# Patient Record
Sex: FEMALE | Race: WHITE | NOT HISPANIC OR LATINO | URBAN - METROPOLITAN AREA
[De-identification: names, ages, dates, MRNs, and addresses within clinical notes are randomized per-mention and may not be internally consistent; named-entity substitution may affect disease eponyms.]

---

## 2023-05-18 ENCOUNTER — TELEPHONE (OUTPATIENT)
Dept: PRIMARY CARE | Facility: CLINIC | Age: 78
End: 2023-05-18
Payer: MEDICARE

## 2023-05-18 NOTE — TELEPHONE ENCOUNTER
Patient called and left  that she needed to reschedule her appointment for 7/27/2023.  Returned call, rescheduled for 8/1/2023.      Patient is requesting for lab orders to be placed for her upcoming appointment.  Call patient when orders placed if needed.  Last labs completed 1/13/2023.

## 2023-07-18 PROBLEM — R09.89 PROLONGED CAPILLARY REFILL TIME: Status: ACTIVE | Noted: 2023-07-18

## 2023-07-18 PROBLEM — J06.9 UPPER RESPIRATORY INFECTION: Status: ACTIVE | Noted: 2023-07-18

## 2023-07-18 PROBLEM — M25.511 RIGHT SHOULDER PAIN: Status: ACTIVE | Noted: 2023-07-18

## 2023-07-18 PROBLEM — R79.89 LOW VITAMIN D LEVEL: Status: ACTIVE | Noted: 2023-07-18

## 2023-07-18 PROBLEM — H69.93 DYSFUNCTION OF BOTH EUSTACHIAN TUBES: Status: ACTIVE | Noted: 2023-07-18

## 2023-07-18 PROBLEM — R07.81 RIB PAIN ON LEFT SIDE: Status: ACTIVE | Noted: 2023-07-18

## 2023-07-18 PROBLEM — Z86.010 HISTORY OF COLON POLYPS: Status: ACTIVE | Noted: 2023-07-18

## 2023-07-18 PROBLEM — S52.614A CLOSED NONDISPLACED FRACTURE OF STYLOID PROCESS OF RIGHT ULNA: Status: ACTIVE | Noted: 2023-07-18

## 2023-07-18 PROBLEM — E04.1 THYROID NODULE: Status: ACTIVE | Noted: 2023-07-18

## 2023-07-18 PROBLEM — M25.631 STIFFNESS OF RIGHT WRIST, NOT ELSEWHERE CLASSIFIED: Status: ACTIVE | Noted: 2023-07-18

## 2023-07-18 PROBLEM — M25.552 HIP PAIN, LEFT: Status: ACTIVE | Noted: 2023-07-18

## 2023-07-18 PROBLEM — S66.912A STRAIN OF LEFT WRIST: Status: ACTIVE | Noted: 2023-07-18

## 2023-07-18 PROBLEM — J04.0 ACUTE LARYNGITIS: Status: ACTIVE | Noted: 2023-07-18

## 2023-07-18 PROBLEM — J34.89 NASAL DISCHARGE WITH WATERY EYES: Status: ACTIVE | Noted: 2023-07-18

## 2023-07-18 PROBLEM — M25.531 RIGHT WRIST PAIN: Status: ACTIVE | Noted: 2023-07-18

## 2023-07-18 PROBLEM — L84 PRE-ULCERATIVE CALLUSES: Status: ACTIVE | Noted: 2023-07-18

## 2023-07-18 PROBLEM — I73.9 PAD (PERIPHERAL ARTERY DISEASE) (CMS-HCC): Status: ACTIVE | Noted: 2023-07-18

## 2023-07-18 PROBLEM — K63.5 COLON POLYPS: Status: ACTIVE | Noted: 2023-07-18

## 2023-07-18 PROBLEM — J06.9 VIRAL URI WITH COUGH: Status: ACTIVE | Noted: 2023-07-18

## 2023-07-18 PROBLEM — J18.9 COMMUNITY ACQUIRED PNEUMONIA: Status: ACTIVE | Noted: 2023-07-18

## 2023-07-18 PROBLEM — H04.209 NASAL DISCHARGE WITH WATERY EYES: Status: ACTIVE | Noted: 2023-07-18

## 2023-07-18 PROBLEM — M25.641 STIFFNESS OF FINGER JOINT OF RIGHT HAND: Status: ACTIVE | Noted: 2023-07-18

## 2023-07-18 PROBLEM — M85.80 OSTEOPENIA: Status: ACTIVE | Noted: 2023-07-18

## 2023-07-18 PROBLEM — R73.03 PREDIABETES: Status: ACTIVE | Noted: 2023-07-18

## 2023-07-18 PROBLEM — B35.3 TINEA PEDIS OF BOTH FEET: Status: ACTIVE | Noted: 2023-07-18

## 2023-07-18 PROBLEM — M81.6 LOCALIZED OSTEOPOROSIS WITHOUT CURRENT PATHOLOGICAL FRACTURE: Status: ACTIVE | Noted: 2023-07-18

## 2023-07-18 PROBLEM — M21.619 BUNION OF GREAT TOE: Status: ACTIVE | Noted: 2023-07-18

## 2023-07-18 PROBLEM — R20.2 PARESTHESIA OF BILATERAL LEGS: Status: ACTIVE | Noted: 2023-07-18

## 2023-07-18 PROBLEM — R10.9 ABDOMINAL CRAMPING: Status: ACTIVE | Noted: 2023-07-18

## 2023-07-18 PROBLEM — J34.89 SINUS PRESSURE: Status: ACTIVE | Noted: 2023-07-18

## 2023-07-18 PROBLEM — L30.9 ECZEMA: Status: ACTIVE | Noted: 2023-07-18

## 2023-07-18 PROBLEM — S52.001A: Status: ACTIVE | Noted: 2023-07-18

## 2023-07-18 PROBLEM — E78.5 DYSLIPIDEMIA: Status: ACTIVE | Noted: 2023-07-18

## 2023-07-18 PROBLEM — S52.101A: Status: ACTIVE | Noted: 2023-07-18

## 2023-07-18 PROBLEM — H66.91 ACUTE OTITIS MEDIA, RIGHT: Status: ACTIVE | Noted: 2023-07-18

## 2023-07-18 PROBLEM — R50.9 FEVER: Status: ACTIVE | Noted: 2023-07-18

## 2023-07-18 PROBLEM — M79.10 MUSCLE ACHE: Status: ACTIVE | Noted: 2023-07-18

## 2023-07-18 PROBLEM — E78.00 ELEVATED LDL CHOLESTEROL LEVEL: Status: ACTIVE | Noted: 2023-07-18

## 2023-07-18 PROBLEM — J01.90 ACUTE SINUS INFECTION: Status: ACTIVE | Noted: 2023-07-18

## 2023-07-18 PROBLEM — M79.606 LOWER EXTREMITY PAIN, DIFFUSE: Status: ACTIVE | Noted: 2023-07-18

## 2023-07-18 PROBLEM — R07.89 INTERMITTENT LEFT-SIDED CHEST PAIN: Status: ACTIVE | Noted: 2023-07-18

## 2023-07-18 PROBLEM — B35.1 ONYCHOMYCOSIS OF TOENAIL: Status: ACTIVE | Noted: 2023-07-18

## 2023-07-18 PROBLEM — E03.9 ACQUIRED HYPOTHYROIDISM: Status: ACTIVE | Noted: 2023-07-18

## 2023-07-18 PROBLEM — M81.0 OSTEOPOROSIS: Status: ACTIVE | Noted: 2023-07-18

## 2023-07-18 PROBLEM — Z86.0100 HISTORY OF COLON POLYPS: Status: ACTIVE | Noted: 2023-07-18

## 2023-07-18 PROBLEM — R26.89 IMBALANCE: Status: ACTIVE | Noted: 2023-07-18

## 2023-07-18 PROBLEM — M47.27 LUMBOSACRAL SPONDYLOSIS WITH RADICULOPATHY: Status: ACTIVE | Noted: 2023-07-18

## 2023-07-18 PROBLEM — S69.91XA INJURY OF RIGHT WRIST: Status: ACTIVE | Noted: 2023-07-18

## 2023-07-18 PROBLEM — R35.0 URINE FREQUENCY: Status: ACTIVE | Noted: 2023-07-18

## 2023-07-18 PROBLEM — M79.621 PAIN OF RIGHT UPPER ARM: Status: ACTIVE | Noted: 2023-07-18

## 2023-07-18 PROBLEM — M20.5X2 HALLUX LIMITUS OF LEFT FOOT: Status: ACTIVE | Noted: 2023-07-18

## 2023-07-18 PROBLEM — J02.9 SORE THROAT: Status: ACTIVE | Noted: 2023-07-18

## 2023-07-18 PROBLEM — M62.838 MUSCLE SPASM: Status: ACTIVE | Noted: 2023-07-18

## 2023-07-18 PROBLEM — I10 BENIGN ESSENTIAL HTN: Status: ACTIVE | Noted: 2023-07-18

## 2023-07-18 PROBLEM — K52.9 CHRONIC COLITIS: Status: ACTIVE | Noted: 2023-07-18

## 2023-07-18 PROBLEM — R16.1 SPLENIC MASS: Status: ACTIVE | Noted: 2023-07-18

## 2023-07-18 PROBLEM — J20.9 ACUTE BRONCHITIS: Status: ACTIVE | Noted: 2023-07-18

## 2023-07-18 PROBLEM — E04.9 THYROID ENLARGED: Status: ACTIVE | Noted: 2023-07-18

## 2023-07-18 PROBLEM — S76.219A GROIN STRAIN: Status: ACTIVE | Noted: 2023-07-18

## 2023-07-18 PROBLEM — R19.7 DIARRHEA: Status: ACTIVE | Noted: 2023-07-18

## 2023-07-18 PROBLEM — R05.9 COUGH: Status: ACTIVE | Noted: 2023-07-18

## 2023-07-18 PROBLEM — S52.551A: Status: ACTIVE | Noted: 2023-07-18

## 2023-07-18 RX ORDER — CALCIUM CARBONATE 600 MG
1 TABLET ORAL DAILY
COMMUNITY
Start: 2019-01-30

## 2023-07-18 RX ORDER — ASCORBIC ACID 125 MG
1 TABLET,CHEWABLE ORAL DAILY
COMMUNITY

## 2023-07-18 RX ORDER — IBUPROFEN 100 MG/5ML
1 SUSPENSION, ORAL (FINAL DOSE FORM) ORAL DAILY
COMMUNITY
Start: 2019-01-30

## 2023-07-18 RX ORDER — CHOLESTYRAMINE 4 G/9G
POWDER, FOR SUSPENSION ORAL EVERY OTHER DAY
COMMUNITY
Start: 2022-07-29 | End: 2024-03-06 | Stop reason: SDUPTHER

## 2023-07-18 RX ORDER — TIZANIDINE 4 MG/1
TABLET ORAL
COMMUNITY
Start: 2021-12-10 | End: 2023-08-01 | Stop reason: SDUPTHER

## 2023-07-18 RX ORDER — PRAVASTATIN SODIUM 20 MG/1
1 TABLET ORAL DAILY
COMMUNITY
Start: 2021-05-27 | End: 2023-08-01 | Stop reason: ALTCHOICE

## 2023-07-18 RX ORDER — LEVOTHYROXINE SODIUM 75 UG/1
1 TABLET ORAL DAILY
COMMUNITY
Start: 2017-03-06 | End: 2023-08-01 | Stop reason: SDUPTHER

## 2023-07-18 RX ORDER — HYDROCHLOROTHIAZIDE 12.5 MG/1
1 TABLET ORAL
COMMUNITY
Start: 2018-05-21 | End: 2023-08-01 | Stop reason: SDUPTHER

## 2023-07-18 RX ORDER — ACETAMINOPHEN 500 MG
TABLET ORAL
COMMUNITY
Start: 2018-01-18

## 2023-07-18 RX ORDER — CALCIUM CARBONATE/VITAMIN D3 500-10/5ML
1 LIQUID (ML) ORAL DAILY
COMMUNITY
Start: 2019-01-30

## 2023-07-18 RX ORDER — EPINEPHRINE 0.22MG
1 AEROSOL WITH ADAPTER (ML) INHALATION DAILY
COMMUNITY
Start: 2016-07-29

## 2023-07-28 LAB
ALANINE AMINOTRANSFERASE (SGPT) (U/L) IN SER/PLAS: 16 U/L (ref 7–45)
ALBUMIN (G/DL) IN SER/PLAS: 4.3 G/DL (ref 3.4–5)
ALKALINE PHOSPHATASE (U/L) IN SER/PLAS: 82 U/L (ref 33–136)
ANION GAP IN SER/PLAS: 14 MMOL/L (ref 10–20)
APPEARANCE, URINE: CLEAR
ASPARTATE AMINOTRANSFERASE (SGOT) (U/L) IN SER/PLAS: 19 U/L (ref 9–39)
BILIRUBIN TOTAL (MG/DL) IN SER/PLAS: 0.7 MG/DL (ref 0–1.2)
BILIRUBIN, URINE: NEGATIVE
BLOOD, URINE: NEGATIVE
CALCIUM (MG/DL) IN SER/PLAS: 9.7 MG/DL (ref 8.6–10.6)
CARBON DIOXIDE, TOTAL (MMOL/L) IN SER/PLAS: 31 MMOL/L (ref 21–32)
CHLORIDE (MMOL/L) IN SER/PLAS: 101 MMOL/L (ref 98–107)
CHOLESTEROL (MG/DL) IN SER/PLAS: 243 MG/DL (ref 0–199)
CHOLESTEROL IN HDL (MG/DL) IN SER/PLAS: 66.3 MG/DL
CHOLESTEROL/HDL RATIO: 3.7
COLOR, URINE: YELLOW
CREATININE (MG/DL) IN SER/PLAS: 0.79 MG/DL (ref 0.5–1.05)
ERYTHROCYTE DISTRIBUTION WIDTH (RATIO) BY AUTOMATED COUNT: 14 % (ref 11.5–14.5)
ERYTHROCYTE MEAN CORPUSCULAR HEMOGLOBIN CONCENTRATION (G/DL) BY AUTOMATED: 31.1 G/DL (ref 32–36)
ERYTHROCYTE MEAN CORPUSCULAR VOLUME (FL) BY AUTOMATED COUNT: 95 FL (ref 80–100)
ERYTHROCYTES (10*6/UL) IN BLOOD BY AUTOMATED COUNT: 4.64 X10E12/L (ref 4–5.2)
GFR FEMALE: 76 ML/MIN/1.73M2
GLUCOSE (MG/DL) IN SER/PLAS: 89 MG/DL (ref 74–99)
GLUCOSE, URINE: NEGATIVE MG/DL
HEMATOCRIT (%) IN BLOOD BY AUTOMATED COUNT: 44.1 % (ref 36–46)
HEMOGLOBIN (G/DL) IN BLOOD: 13.7 G/DL (ref 12–16)
KETONES, URINE: NEGATIVE MG/DL
LDL: 163 MG/DL (ref 0–99)
LEUKOCYTE ESTERASE, URINE: NEGATIVE
LEUKOCYTES (10*3/UL) IN BLOOD BY AUTOMATED COUNT: 4.9 X10E9/L (ref 4.4–11.3)
NITRITE, URINE: NEGATIVE
NRBC (PER 100 WBCS) BY AUTOMATED COUNT: 0 /100 WBC (ref 0–0)
PH, URINE: 7 (ref 5–8)
PLATELETS (10*3/UL) IN BLOOD AUTOMATED COUNT: 207 X10E9/L (ref 150–450)
POTASSIUM (MMOL/L) IN SER/PLAS: 4.3 MMOL/L (ref 3.5–5.3)
PROTEIN TOTAL: 6.9 G/DL (ref 6.4–8.2)
PROTEIN, URINE: NEGATIVE MG/DL
SODIUM (MMOL/L) IN SER/PLAS: 142 MMOL/L (ref 136–145)
SPECIFIC GRAVITY, URINE: 1.01 (ref 1–1.03)
THYROTROPIN (MIU/L) IN SER/PLAS BY DETECTION LIMIT <= 0.05 MIU/L: 0.49 MIU/L (ref 0.44–3.98)
TRIGLYCERIDE (MG/DL) IN SER/PLAS: 71 MG/DL (ref 0–149)
UREA NITROGEN (MG/DL) IN SER/PLAS: 20 MG/DL (ref 6–23)
UROBILINOGEN, URINE: <2 MG/DL (ref 0–1.9)
VLDL: 14 MG/DL (ref 0–40)

## 2023-07-31 ENCOUNTER — TELEPHONE (OUTPATIENT)
Dept: PRIMARY CARE | Facility: CLINIC | Age: 78
End: 2023-07-31
Payer: MEDICARE

## 2023-07-31 NOTE — TELEPHONE ENCOUNTER
----- Message from Wang Ramirez MD sent at 7/29/2023  8:24 AM EDT -----  LDL is further elevated, other labs look good.  ----- Message -----  From: [facility] - Lab Results In  Sent: 7/28/2023   9:42 PM EDT  To: Wang Ramirez MD

## 2023-08-01 ENCOUNTER — OFFICE VISIT (OUTPATIENT)
Dept: PRIMARY CARE | Facility: CLINIC | Age: 78
End: 2023-08-01
Payer: MEDICARE

## 2023-08-01 VITALS
HEART RATE: 76 BPM | OXYGEN SATURATION: 99 % | DIASTOLIC BLOOD PRESSURE: 72 MMHG | SYSTOLIC BLOOD PRESSURE: 128 MMHG | RESPIRATION RATE: 12 BRPM | BODY MASS INDEX: 23.74 KG/M2 | WEIGHT: 134 LBS | HEIGHT: 63 IN

## 2023-08-01 DIAGNOSIS — I73.9 PAD (PERIPHERAL ARTERY DISEASE) (CMS-HCC): ICD-10-CM

## 2023-08-01 DIAGNOSIS — E03.9 ACQUIRED HYPOTHYROIDISM: ICD-10-CM

## 2023-08-01 DIAGNOSIS — M81.0 AGE RELATED OSTEOPOROSIS, UNSPECIFIED PATHOLOGICAL FRACTURE PRESENCE: ICD-10-CM

## 2023-08-01 DIAGNOSIS — I10 BENIGN ESSENTIAL HTN: Primary | ICD-10-CM

## 2023-08-01 DIAGNOSIS — Z00.00 HEALTHCARE MAINTENANCE: ICD-10-CM

## 2023-08-01 DIAGNOSIS — E78.00 ELEVATED LDL CHOLESTEROL LEVEL: ICD-10-CM

## 2023-08-01 DIAGNOSIS — M62.838 MUSCLE SPASM: ICD-10-CM

## 2023-08-01 PROBLEM — M25.511 RIGHT SHOULDER PAIN: Status: RESOLVED | Noted: 2023-07-18 | Resolved: 2023-08-01

## 2023-08-01 PROBLEM — R05.9 COUGH: Status: RESOLVED | Noted: 2023-07-18 | Resolved: 2023-08-01

## 2023-08-01 PROBLEM — S69.91XA INJURY OF RIGHT WRIST: Status: RESOLVED | Noted: 2023-07-18 | Resolved: 2023-08-01

## 2023-08-01 PROBLEM — R50.9 FEVER: Status: RESOLVED | Noted: 2023-07-18 | Resolved: 2023-08-01

## 2023-08-01 PROBLEM — R35.0 URINE FREQUENCY: Status: RESOLVED | Noted: 2023-07-18 | Resolved: 2023-08-01

## 2023-08-01 PROBLEM — R79.89 LOW VITAMIN D LEVEL: Status: RESOLVED | Noted: 2023-07-18 | Resolved: 2023-08-01

## 2023-08-01 PROBLEM — E78.5 DYSLIPIDEMIA: Status: RESOLVED | Noted: 2023-07-18 | Resolved: 2023-08-01

## 2023-08-01 PROBLEM — S52.551A: Status: RESOLVED | Noted: 2023-07-18 | Resolved: 2023-08-01

## 2023-08-01 PROBLEM — J20.9 ACUTE BRONCHITIS: Status: RESOLVED | Noted: 2023-07-18 | Resolved: 2023-08-01

## 2023-08-01 PROBLEM — M79.10 MUSCLE ACHE: Status: RESOLVED | Noted: 2023-07-18 | Resolved: 2023-08-01

## 2023-08-01 PROBLEM — J01.90 ACUTE SINUS INFECTION: Status: RESOLVED | Noted: 2023-07-18 | Resolved: 2023-08-01

## 2023-08-01 PROBLEM — J34.89 SINUS PRESSURE: Status: RESOLVED | Noted: 2023-07-18 | Resolved: 2023-08-01

## 2023-08-01 PROBLEM — S52.101A: Status: RESOLVED | Noted: 2023-07-18 | Resolved: 2023-08-01

## 2023-08-01 PROBLEM — R26.89 IMBALANCE: Status: RESOLVED | Noted: 2023-07-18 | Resolved: 2023-08-01

## 2023-08-01 PROBLEM — R09.89 PROLONGED CAPILLARY REFILL TIME: Status: RESOLVED | Noted: 2023-07-18 | Resolved: 2023-08-01

## 2023-08-01 PROBLEM — M85.80 OSTEOPENIA: Status: RESOLVED | Noted: 2023-07-18 | Resolved: 2023-08-01

## 2023-08-01 PROBLEM — M25.531 RIGHT WRIST PAIN: Status: RESOLVED | Noted: 2023-07-18 | Resolved: 2023-08-01

## 2023-08-01 PROBLEM — M79.621 PAIN OF RIGHT UPPER ARM: Status: RESOLVED | Noted: 2023-07-18 | Resolved: 2023-08-01

## 2023-08-01 PROBLEM — S76.219A GROIN STRAIN: Status: RESOLVED | Noted: 2023-07-18 | Resolved: 2023-08-01

## 2023-08-01 PROBLEM — J04.0 ACUTE LARYNGITIS: Status: RESOLVED | Noted: 2023-07-18 | Resolved: 2023-08-01

## 2023-08-01 PROBLEM — J02.9 SORE THROAT: Status: RESOLVED | Noted: 2023-07-18 | Resolved: 2023-08-01

## 2023-08-01 PROBLEM — E04.1 THYROID NODULE: Status: RESOLVED | Noted: 2023-07-18 | Resolved: 2023-08-01

## 2023-08-01 PROBLEM — B35.3 TINEA PEDIS OF BOTH FEET: Status: RESOLVED | Noted: 2023-07-18 | Resolved: 2023-08-01

## 2023-08-01 PROBLEM — H66.91 ACUTE OTITIS MEDIA, RIGHT: Status: RESOLVED | Noted: 2023-07-18 | Resolved: 2023-08-01

## 2023-08-01 PROBLEM — J06.9 UPPER RESPIRATORY INFECTION: Status: RESOLVED | Noted: 2023-07-18 | Resolved: 2023-08-01

## 2023-08-01 PROBLEM — S52.614A CLOSED NONDISPLACED FRACTURE OF STYLOID PROCESS OF RIGHT ULNA: Status: RESOLVED | Noted: 2023-07-18 | Resolved: 2023-08-01

## 2023-08-01 PROBLEM — S66.912A STRAIN OF LEFT WRIST: Status: RESOLVED | Noted: 2023-07-18 | Resolved: 2023-08-01

## 2023-08-01 PROBLEM — M25.631 STIFFNESS OF RIGHT WRIST, NOT ELSEWHERE CLASSIFIED: Status: RESOLVED | Noted: 2023-07-18 | Resolved: 2023-08-01

## 2023-08-01 PROBLEM — J18.9 COMMUNITY ACQUIRED PNEUMONIA: Status: RESOLVED | Noted: 2023-07-18 | Resolved: 2023-08-01

## 2023-08-01 PROBLEM — S52.001A: Status: RESOLVED | Noted: 2023-07-18 | Resolved: 2023-08-01

## 2023-08-01 PROBLEM — R73.03 PREDIABETES: Status: RESOLVED | Noted: 2023-07-18 | Resolved: 2023-08-01

## 2023-08-01 PROBLEM — J06.9 VIRAL URI WITH COUGH: Status: RESOLVED | Noted: 2023-07-18 | Resolved: 2023-08-01

## 2023-08-01 PROBLEM — L84 PRE-ULCERATIVE CALLUSES: Status: RESOLVED | Noted: 2023-07-18 | Resolved: 2023-08-01

## 2023-08-01 PROBLEM — R07.81 RIB PAIN ON LEFT SIDE: Status: RESOLVED | Noted: 2023-07-18 | Resolved: 2023-08-01

## 2023-08-01 PROBLEM — M25.552 HIP PAIN, LEFT: Status: RESOLVED | Noted: 2023-07-18 | Resolved: 2023-08-01

## 2023-08-01 PROBLEM — E04.9 THYROID ENLARGED: Status: RESOLVED | Noted: 2023-07-18 | Resolved: 2023-08-01

## 2023-08-01 PROCEDURE — 99214 OFFICE O/P EST MOD 30 MIN: CPT | Performed by: FAMILY MEDICINE

## 2023-08-01 PROCEDURE — 1036F TOBACCO NON-USER: CPT | Performed by: FAMILY MEDICINE

## 2023-08-01 PROCEDURE — 1159F MED LIST DOCD IN RCRD: CPT | Performed by: FAMILY MEDICINE

## 2023-08-01 PROCEDURE — 3078F DIAST BP <80 MM HG: CPT | Performed by: FAMILY MEDICINE

## 2023-08-01 PROCEDURE — 3074F SYST BP LT 130 MM HG: CPT | Performed by: FAMILY MEDICINE

## 2023-08-01 PROCEDURE — 1125F AMNT PAIN NOTED PAIN PRSNT: CPT | Performed by: FAMILY MEDICINE

## 2023-08-01 RX ORDER — LEVOTHYROXINE SODIUM 75 UG/1
75 TABLET ORAL DAILY
Qty: 90 TABLET | Refills: 1 | Status: SHIPPED | OUTPATIENT
Start: 2023-08-01 | End: 2023-08-24

## 2023-08-01 RX ORDER — HYDROCHLOROTHIAZIDE 12.5 MG/1
12.5 TABLET ORAL
Qty: 90 TABLET | Refills: 1 | Status: SHIPPED | OUTPATIENT
Start: 2023-08-01 | End: 2023-08-24

## 2023-08-01 RX ORDER — TIZANIDINE 4 MG/1
4 TABLET ORAL NIGHTLY PRN
Qty: 90 TABLET | Refills: 1 | Status: SHIPPED | OUTPATIENT
Start: 2023-08-01 | End: 2024-01-02

## 2023-08-01 ASSESSMENT — ENCOUNTER SYMPTOMS
HEADACHES: 0
CHEST TIGHTNESS: 0
NERVOUS/ANXIOUS: 0
ABDOMINAL PAIN: 0
DYSPHORIC MOOD: 0
LIGHT-HEADEDNESS: 0
WHEEZING: 0
ABDOMINAL DISTENTION: 0
FATIGUE: 0
NAUSEA: 0
CHILLS: 0
DIFFICULTY URINATING: 0
MYALGIAS: 0
DEPRESSION: 0
SLEEP DISTURBANCE: 0
SINUS PRESSURE: 0
ARTHRALGIAS: 0
ADENOPATHY: 0
DIARRHEA: 0
LOSS OF SENSATION IN FEET: 0
SHORTNESS OF BREATH: 0
DYSURIA: 0
OCCASIONAL FEELINGS OF UNSTEADINESS: 0
BRUISES/BLEEDS EASILY: 0
APPETITE CHANGE: 0
FEVER: 0

## 2023-08-01 NOTE — PROGRESS NOTES
"Subjective   Patient ID: Jacqueline Min is a 77 y.o. female who presents for Follow-up.  Pt has chronic stable HTN, PVD.  Pt is taking HCTZ. Tolerating well.  Exercising 6-7 days per week   Low sodium diet is usually being followed.   Is monitoring home blood pressures. Readings are in good range.  Denies HA, vision changes or CP.     Pt has Dyslipidemia and asymptomatic PAD.  Lipid panel showed .  Currently not taking Pravastatin, stopped in February to see if Cholestyramine from GI would control her LDL.    For chronic back pain and muscle cramping she is doing well on Tizanidine as needed.     Pt has chronic, stable hypothyroidism.   Taking Synthroid  TSH is in good range.  Energy is ok. Pt denies significant weight gain, low mood, constipation, or skin changes.   Medication is tolerated well.     For chronic foot pain she would like referral to new podiatrist.     Pt has chronic OP. Taking calcium and Vit D. Tolerating well. DEXA is due to recheck.           Review of Systems   Constitutional:  Negative for appetite change, chills, fatigue and fever.   HENT:  Negative for congestion, ear pain and sinus pressure.    Eyes:  Negative for visual disturbance.   Respiratory:  Negative for chest tightness, shortness of breath and wheezing.    Cardiovascular:  Negative for chest pain.   Gastrointestinal:  Negative for abdominal distention, abdominal pain, diarrhea and nausea.   Genitourinary:  Negative for difficulty urinating, dysuria and pelvic pain.   Musculoskeletal:  Negative for arthralgias and myalgias.   Skin:  Negative for rash.   Allergic/Immunologic: Negative for immunocompromised state.   Neurological:  Negative for light-headedness and headaches.   Hematological:  Negative for adenopathy. Does not bruise/bleed easily.   Psychiatric/Behavioral:  Negative for dysphoric mood and sleep disturbance. The patient is not nervous/anxious.        Objective   /72   Pulse 76   Resp 12   Ht 1.6 m (5' 3\")  "  Wt 60.8 kg (134 lb)   SpO2 99%   BMI 23.74 kg/m²    Physical Exam  Constitutional:       General: She is not in acute distress.     Appearance: Normal appearance.   Cardiovascular:      Rate and Rhythm: Normal rate and regular rhythm.      Heart sounds: Normal heart sounds. No murmur heard.  Pulmonary:      Effort: Pulmonary effort is normal.      Breath sounds: Normal breath sounds.   Abdominal:      Palpations: Abdomen is soft.      Tenderness: There is no abdominal tenderness.   Neurological:      Mental Status: She is alert.   Psychiatric:         Mood and Affect: Mood normal.         Judgment: Judgment normal.           Assessment/Plan   Diagnoses and all orders for this visit:  Benign essential HTN - doing well, continue current med and monitor  Elevated LDL cholesterol level - LDL higher, continue Cholestyramine for now and will consider adding back statin if needed.  PAD (peripheral artery disease) (CMS/HCC) - asymptomatic, to monitor with health screening yearly  Acquired hypothyroidism - monitor with labs  Age related osteoporosis, unspecified pathological fracture presence - due for repeat DEXA, planned in December  Follow up in 6months, 30mins for physical

## 2023-08-01 NOTE — PROGRESS NOTES
"Subjective   Patient ID: Jacqueline Min is a 77 y.o. female who presents for Follow-up.    HPI     Review of Systems    Objective   /72   Pulse 76   Resp 12   Ht 1.6 m (5' 3\")   Wt 60.8 kg (134 lb)   SpO2 99%   BMI 23.74 kg/m²     Physical Exam    Assessment/Plan          "

## 2023-08-16 DIAGNOSIS — I10 BENIGN ESSENTIAL HTN: ICD-10-CM

## 2023-08-16 DIAGNOSIS — E03.9 ACQUIRED HYPOTHYROIDISM: ICD-10-CM

## 2023-08-24 RX ORDER — LEVOTHYROXINE SODIUM 75 UG/1
75 TABLET ORAL DAILY
Qty: 90 TABLET | Refills: 1 | Status: SHIPPED | OUTPATIENT
Start: 2023-08-24 | End: 2024-02-22 | Stop reason: SDUPTHER

## 2023-08-24 RX ORDER — HYDROCHLOROTHIAZIDE 12.5 MG/1
12.5 TABLET ORAL
Qty: 90 TABLET | Refills: 1 | Status: SHIPPED | OUTPATIENT
Start: 2023-08-24 | End: 2024-02-22 | Stop reason: SDUPTHER

## 2023-10-17 ENCOUNTER — TELEPHONE (OUTPATIENT)
Dept: PRIMARY CARE | Facility: CLINIC | Age: 78
End: 2023-10-17
Payer: MEDICARE

## 2023-10-17 DIAGNOSIS — Z79.2 PROPHYLACTIC ANTIBIOTIC: Primary | ICD-10-CM

## 2023-10-17 RX ORDER — AMOXICILLIN 500 MG/1
2000 CAPSULE ORAL ONCE AS NEEDED
Qty: 4 CAPSULE | Refills: 2 | Status: SHIPPED | OUTPATIENT
Start: 2023-10-17

## 2023-10-17 NOTE — TELEPHONE ENCOUNTER
Pt has dentist appt today and needs amox 500mg (4 caps) prior to appt and forgot to call in prior. Are you able to do this for her?

## 2023-11-27 ENCOUNTER — OFFICE VISIT (OUTPATIENT)
Dept: PRIMARY CARE | Facility: CLINIC | Age: 78
End: 2023-11-27
Payer: MEDICARE

## 2023-11-27 VITALS
HEART RATE: 68 BPM | RESPIRATION RATE: 12 BRPM | BODY MASS INDEX: 24.27 KG/M2 | DIASTOLIC BLOOD PRESSURE: 78 MMHG | SYSTOLIC BLOOD PRESSURE: 124 MMHG | TEMPERATURE: 96.8 F | WEIGHT: 137 LBS

## 2023-11-27 DIAGNOSIS — J20.9 ACUTE BRONCHITIS WITH BRONCHOSPASM: Primary | ICD-10-CM

## 2023-11-27 PROCEDURE — 1036F TOBACCO NON-USER: CPT | Performed by: FAMILY MEDICINE

## 2023-11-27 PROCEDURE — 1125F AMNT PAIN NOTED PAIN PRSNT: CPT | Performed by: FAMILY MEDICINE

## 2023-11-27 PROCEDURE — 3074F SYST BP LT 130 MM HG: CPT | Performed by: FAMILY MEDICINE

## 2023-11-27 PROCEDURE — 99213 OFFICE O/P EST LOW 20 MIN: CPT | Performed by: FAMILY MEDICINE

## 2023-11-27 PROCEDURE — 3078F DIAST BP <80 MM HG: CPT | Performed by: FAMILY MEDICINE

## 2023-11-27 PROCEDURE — 1159F MED LIST DOCD IN RCRD: CPT | Performed by: FAMILY MEDICINE

## 2023-11-27 RX ORDER — AZITHROMYCIN 250 MG/1
TABLET, FILM COATED ORAL
Qty: 6 TABLET | Refills: 0 | Status: SHIPPED | OUTPATIENT
Start: 2023-11-27 | End: 2023-12-02

## 2023-11-27 RX ORDER — ALBUTEROL SULFATE 90 UG/1
1-2 AEROSOL, METERED RESPIRATORY (INHALATION) EVERY 4 HOURS PRN
Qty: 8.5 G | Refills: 0 | Status: SHIPPED | OUTPATIENT
Start: 2023-11-27 | End: 2024-02-22 | Stop reason: WASHOUT

## 2023-11-27 RX ORDER — BENZONATATE 200 MG/1
200 CAPSULE ORAL 3 TIMES DAILY PRN
Qty: 30 CAPSULE | Refills: 0 | Status: SHIPPED | OUTPATIENT
Start: 2023-11-27 | End: 2023-12-27

## 2023-11-27 ASSESSMENT — ENCOUNTER SYMPTOMS
DIFFICULTY URINATING: 0
DYSPHORIC MOOD: 0
ABDOMINAL PAIN: 0
BRUISES/BLEEDS EASILY: 0
NAUSEA: 0
WHEEZING: 0
HEADACHES: 0
COUGH: 1
SHORTNESS OF BREATH: 0
ADENOPATHY: 0
LIGHT-HEADEDNESS: 0
SLEEP DISTURBANCE: 0
FATIGUE: 1
CHILLS: 0
MYALGIAS: 0
SINUS PRESSURE: 0
APPETITE CHANGE: 0
DIARRHEA: 0
ABDOMINAL DISTENTION: 0
ARTHRALGIAS: 0
NERVOUS/ANXIOUS: 0
CHEST TIGHTNESS: 0
DYSURIA: 0
FEVER: 0

## 2023-11-27 NOTE — PROGRESS NOTES
Subjective   Patient ID: Jacqueline Min is a 77 y.o. female who presents for Cough.    HPI     Review of Systems    Objective   /78   Pulse 68   Temp 36 °C (96.8 °F)   Resp 12   Wt 62.1 kg (137 lb)   BMI 24.27 kg/m²     Physical Exam    Assessment/Plan

## 2023-11-27 NOTE — PROGRESS NOTES
Subjective   Patient ID: Jacqueline Min is a 77 y.o. female who presents for Cough.  Pt has about 2 weeks productive cough, hoarseness and sore throat. Denies fever, chills, sinus congestion, PND. COVID test negative 4 days ago .    Pt reports symptoms are worsening.   Pt has tried Mucinex DM for treatment without improvement.     Cough  Pertinent negatives include no chest pain, chills, ear pain, fever, headaches, myalgias, rash, shortness of breath or wheezing.       Review of Systems   Constitutional:  Positive for fatigue. Negative for appetite change, chills and fever.   HENT:  Negative for congestion, ear pain and sinus pressure.    Eyes:  Negative for visual disturbance.   Respiratory:  Positive for cough. Negative for chest tightness, shortness of breath and wheezing.    Cardiovascular:  Negative for chest pain.   Gastrointestinal:  Negative for abdominal distention, abdominal pain, diarrhea and nausea.   Genitourinary:  Negative for difficulty urinating, dysuria and pelvic pain.   Musculoskeletal:  Negative for arthralgias and myalgias.   Skin:  Negative for rash.   Allergic/Immunologic: Negative for immunocompromised state.   Neurological:  Negative for light-headedness and headaches.   Hematological:  Negative for adenopathy. Does not bruise/bleed easily.   Psychiatric/Behavioral:  Negative for dysphoric mood and sleep disturbance. The patient is not nervous/anxious.        Objective   /78   Pulse 68   Temp 36 °C (96.8 °F)   Resp 12   Wt 62.1 kg (137 lb)   BMI 24.27 kg/m²    Physical Exam  Constitutional:       General: She is not in acute distress.     Appearance: Normal appearance. She is not ill-appearing.   HENT:      Head: Normocephalic and atraumatic.      Right Ear: Tympanic membrane, ear canal and external ear normal.      Left Ear: Tympanic membrane, ear canal and external ear normal.      Nose: Congestion present. No rhinorrhea.      Mouth/Throat:      Mouth: Mucous membranes are  moist.      Pharynx: No oropharyngeal exudate or posterior oropharyngeal erythema.   Eyes:      Conjunctiva/sclera: Conjunctivae normal.   Cardiovascular:      Rate and Rhythm: Normal rate and regular rhythm.      Heart sounds: Normal heart sounds. No murmur heard.  Pulmonary:      Breath sounds: Wheezing (L base) present. No rhonchi or rales.   Abdominal:      General: Bowel sounds are normal. There is no distension.      Palpations: Abdomen is soft. There is no mass.      Tenderness: There is no abdominal tenderness.   Musculoskeletal:      Cervical back: Neck supple. No tenderness.   Lymphadenopathy:      Cervical: No cervical adenopathy.   Neurological:      Mental Status: She is alert.   Psychiatric:         Mood and Affect: Mood normal.         Behavior: Behavior normal.         Judgment: Judgment normal.           Assessment/Plan   Diagnoses and all orders for this visit:  Acute bronchitis with bronchospasm- with duration of symptoms will treat with Zpak, Albuterol and Tessalon. Recommend rest, increased fluids, nasal saline at least 3x daily (Ie. Sinus Rinse), and Tylenol as needed.   Follow up as planned

## 2023-12-18 ENCOUNTER — ANCILLARY PROCEDURE (OUTPATIENT)
Dept: RADIOLOGY | Facility: CLINIC | Age: 78
End: 2023-12-18
Payer: MEDICARE

## 2023-12-18 DIAGNOSIS — M81.6 LOCALIZED OSTEOPOROSIS (LEQUESNE): ICD-10-CM

## 2023-12-18 PROCEDURE — 77080 DXA BONE DENSITY AXIAL: CPT | Performed by: RADIOLOGY

## 2023-12-18 PROCEDURE — 77080 DXA BONE DENSITY AXIAL: CPT

## 2024-01-02 DIAGNOSIS — M62.838 MUSCLE SPASM: ICD-10-CM

## 2024-01-02 RX ORDER — TIZANIDINE 4 MG/1
TABLET ORAL
Qty: 90 TABLET | Refills: 0 | Status: SHIPPED | OUTPATIENT
Start: 2024-01-02

## 2024-01-05 ENCOUNTER — TELEPHONE (OUTPATIENT)
Dept: PRIMARY CARE | Facility: CLINIC | Age: 79
End: 2024-01-05
Payer: MEDICARE

## 2024-01-05 NOTE — TELEPHONE ENCOUNTER
Spoke w/pt regarding results and instructions in detail. Pt to speak  with PCP at visit about need for calcium, unsure if she should take that much d/t risks associated with too much calcium.

## 2024-01-05 NOTE — TELEPHONE ENCOUNTER
----- Message from Wang Ramirez MD sent at 1/4/2024  7:49 PM EST -----  DEXA shows osteopenia (mildly low bone density). Recommend regular exercise and calcium rich diet ( ie. milk, yogurt, cheese). Also start Calcium 600mg once daily, and Vit [id number] daily. Recheck DEXA in 2yrs   ----- Message -----  From: [facility], Radiology Results In  Sent: 1/4/2024   4:21 PM EST  To: Wang Ramirez MD

## 2024-01-18 ENCOUNTER — APPOINTMENT (OUTPATIENT)
Dept: PRIMARY CARE | Facility: CLINIC | Age: 79
End: 2024-01-18
Payer: MEDICARE

## 2024-02-19 ENCOUNTER — LAB (OUTPATIENT)
Dept: LAB | Facility: LAB | Age: 79
End: 2024-02-19
Payer: MEDICARE

## 2024-02-19 DIAGNOSIS — Z00.00 HEALTHCARE MAINTENANCE: ICD-10-CM

## 2024-02-19 DIAGNOSIS — E03.9 ACQUIRED HYPOTHYROIDISM: ICD-10-CM

## 2024-02-19 DIAGNOSIS — I10 BENIGN ESSENTIAL HTN: ICD-10-CM

## 2024-02-19 DIAGNOSIS — E78.00 ELEVATED LDL CHOLESTEROL LEVEL: ICD-10-CM

## 2024-02-19 LAB
ALBUMIN SERPL BCP-MCNC: 4.4 G/DL (ref 3.4–5)
ALP SERPL-CCNC: 90 U/L (ref 33–136)
ALT SERPL W P-5'-P-CCNC: 16 U/L (ref 7–45)
ANION GAP SERPL CALC-SCNC: 14 MMOL/L (ref 10–20)
AST SERPL W P-5'-P-CCNC: 16 U/L (ref 9–39)
BILIRUB SERPL-MCNC: 0.6 MG/DL (ref 0–1.2)
BUN SERPL-MCNC: 17 MG/DL (ref 6–23)
CALCIUM SERPL-MCNC: 9.8 MG/DL (ref 8.6–10.6)
CHLORIDE SERPL-SCNC: 100 MMOL/L (ref 98–107)
CHOLEST SERPL-MCNC: 279 MG/DL (ref 0–199)
CHOLESTEROL/HDL RATIO: 4.4
CO2 SERPL-SCNC: 32 MMOL/L (ref 21–32)
CREAT SERPL-MCNC: 0.85 MG/DL (ref 0.5–1.05)
EGFRCR SERPLBLD CKD-EPI 2021: 69 ML/MIN/1.73M*2
GLUCOSE SERPL-MCNC: 93 MG/DL (ref 74–99)
HCV AB SER QL: NONREACTIVE
HDLC SERPL-MCNC: 63 MG/DL
LDLC SERPL CALC-MCNC: 197 MG/DL
NON HDL CHOLESTEROL: 216 MG/DL (ref 0–149)
POTASSIUM SERPL-SCNC: 4.3 MMOL/L (ref 3.5–5.3)
PROT SERPL-MCNC: 6.9 G/DL (ref 6.4–8.2)
SODIUM SERPL-SCNC: 142 MMOL/L (ref 136–145)
TRIGL SERPL-MCNC: 95 MG/DL (ref 0–149)
TSH SERPL-ACNC: 0.84 MIU/L (ref 0.44–3.98)
VLDL: 19 MG/DL (ref 0–40)

## 2024-02-19 PROCEDURE — 86803 HEPATITIS C AB TEST: CPT

## 2024-02-19 PROCEDURE — 80053 COMPREHEN METABOLIC PANEL: CPT

## 2024-02-19 PROCEDURE — 36415 COLL VENOUS BLD VENIPUNCTURE: CPT

## 2024-02-19 PROCEDURE — 80061 LIPID PANEL: CPT

## 2024-02-19 PROCEDURE — 84443 ASSAY THYROID STIM HORMONE: CPT

## 2024-02-22 ENCOUNTER — OFFICE VISIT (OUTPATIENT)
Dept: PRIMARY CARE | Facility: CLINIC | Age: 79
End: 2024-02-22
Payer: MEDICARE

## 2024-02-22 VITALS
DIASTOLIC BLOOD PRESSURE: 76 MMHG | OXYGEN SATURATION: 97 % | BODY MASS INDEX: 24.27 KG/M2 | RESPIRATION RATE: 12 BRPM | SYSTOLIC BLOOD PRESSURE: 114 MMHG | HEART RATE: 68 BPM | HEIGHT: 63 IN | WEIGHT: 137 LBS

## 2024-02-22 DIAGNOSIS — M81.0 AGE RELATED OSTEOPOROSIS, UNSPECIFIED PATHOLOGICAL FRACTURE PRESENCE: ICD-10-CM

## 2024-02-22 DIAGNOSIS — E78.00 ELEVATED LDL CHOLESTEROL LEVEL: ICD-10-CM

## 2024-02-22 DIAGNOSIS — I73.9 PAD (PERIPHERAL ARTERY DISEASE) (CMS-HCC): ICD-10-CM

## 2024-02-22 DIAGNOSIS — Z00.00 HEALTHCARE MAINTENANCE: Primary | ICD-10-CM

## 2024-02-22 DIAGNOSIS — R19.7 DIARRHEA, UNSPECIFIED TYPE: ICD-10-CM

## 2024-02-22 DIAGNOSIS — I10 BENIGN ESSENTIAL HTN: ICD-10-CM

## 2024-02-22 DIAGNOSIS — E03.9 ACQUIRED HYPOTHYROIDISM: ICD-10-CM

## 2024-02-22 PROCEDURE — G0439 PPPS, SUBSEQ VISIT: HCPCS | Performed by: FAMILY MEDICINE

## 2024-02-22 PROCEDURE — 1125F AMNT PAIN NOTED PAIN PRSNT: CPT | Performed by: FAMILY MEDICINE

## 2024-02-22 PROCEDURE — 99214 OFFICE O/P EST MOD 30 MIN: CPT | Performed by: FAMILY MEDICINE

## 2024-02-22 PROCEDURE — 1160F RVW MEDS BY RX/DR IN RCRD: CPT | Performed by: FAMILY MEDICINE

## 2024-02-22 PROCEDURE — 1123F ACP DISCUSS/DSCN MKR DOCD: CPT | Performed by: FAMILY MEDICINE

## 2024-02-22 PROCEDURE — 3078F DIAST BP <80 MM HG: CPT | Performed by: FAMILY MEDICINE

## 2024-02-22 PROCEDURE — 1159F MED LIST DOCD IN RCRD: CPT | Performed by: FAMILY MEDICINE

## 2024-02-22 PROCEDURE — 3074F SYST BP LT 130 MM HG: CPT | Performed by: FAMILY MEDICINE

## 2024-02-22 PROCEDURE — 1036F TOBACCO NON-USER: CPT | Performed by: FAMILY MEDICINE

## 2024-02-22 PROCEDURE — 1170F FXNL STATUS ASSESSED: CPT | Performed by: FAMILY MEDICINE

## 2024-02-22 RX ORDER — GLUCOSAM/CHONDRO/HERB 149/HYAL 750-100 MG
TABLET ORAL
COMMUNITY
Start: 2005-06-08

## 2024-02-22 RX ORDER — HYDROCHLOROTHIAZIDE 12.5 MG/1
12.5 TABLET ORAL
Qty: 90 TABLET | Refills: 1 | Status: SHIPPED | OUTPATIENT
Start: 2024-02-22 | End: 2024-02-22 | Stop reason: SDUPTHER

## 2024-02-22 RX ORDER — LEVOTHYROXINE SODIUM 75 UG/1
75 TABLET ORAL DAILY
Qty: 90 TABLET | Refills: 1 | Status: SHIPPED | OUTPATIENT
Start: 2024-02-22

## 2024-02-22 RX ORDER — LEVOTHYROXINE SODIUM 75 UG/1
75 TABLET ORAL DAILY
Qty: 90 TABLET | Refills: 1 | Status: SHIPPED | OUTPATIENT
Start: 2024-02-22 | End: 2024-02-22 | Stop reason: SDUPTHER

## 2024-02-22 RX ORDER — HYDROCHLOROTHIAZIDE 12.5 MG/1
12.5 TABLET ORAL
Qty: 90 TABLET | Refills: 1 | Status: SHIPPED | OUTPATIENT
Start: 2024-02-22

## 2024-02-22 ASSESSMENT — ENCOUNTER SYMPTOMS
FATIGUE: 0
SINUS PRESSURE: 0
NERVOUS/ANXIOUS: 0
SHORTNESS OF BREATH: 0
ABDOMINAL DISTENTION: 0
WHEEZING: 0
ARTHRALGIAS: 0
APPETITE CHANGE: 0
CHILLS: 0
SLEEP DISTURBANCE: 0
FATIGUE: 0
DIARRHEA: 1
ABDOMINAL DISTENTION: 0
DIFFICULTY URINATING: 0
DYSURIA: 0
NAUSEA: 0
SHORTNESS OF BREATH: 0
NAUSEA: 0
DEPRESSION: 0
LOSS OF SENSATION IN FEET: 0
LIGHT-HEADEDNESS: 0
LIGHT-HEADEDNESS: 0
DIFFICULTY URINATING: 0
APPETITE CHANGE: 0
FEVER: 0
HEADACHES: 0
SINUS PRESSURE: 0
WHEEZING: 0
BRUISES/BLEEDS EASILY: 0
HEADACHES: 0
OCCASIONAL FEELINGS OF UNSTEADINESS: 0
ABDOMINAL PAIN: 0
NERVOUS/ANXIOUS: 0
CHEST TIGHTNESS: 0
CHEST TIGHTNESS: 0
DYSPHORIC MOOD: 0
ADENOPATHY: 0
BRUISES/BLEEDS EASILY: 0
ABDOMINAL PAIN: 0
MYALGIAS: 0
MYALGIAS: 0
SLEEP DISTURBANCE: 0
DIARRHEA: 0
DYSPHORIC MOOD: 0
FEVER: 0
CHILLS: 0
ADENOPATHY: 0
ARTHRALGIAS: 0
DYSURIA: 0

## 2024-02-22 ASSESSMENT — PATIENT HEALTH QUESTIONNAIRE - PHQ9
1. LITTLE INTEREST OR PLEASURE IN DOING THINGS: NOT AT ALL
2. FEELING DOWN, DEPRESSED OR HOPELESS: NOT AT ALL
SUM OF ALL RESPONSES TO PHQ9 QUESTIONS 1 AND 2: 0

## 2024-02-22 ASSESSMENT — ACTIVITIES OF DAILY LIVING (ADL)
DRESSING: INDEPENDENT
MANAGING_FINANCES: INDEPENDENT
TAKING_MEDICATION: INDEPENDENT
DOING_HOUSEWORK: INDEPENDENT
BATHING: INDEPENDENT
GROCERY_SHOPPING: INDEPENDENT

## 2024-02-22 NOTE — PROGRESS NOTES
Subjective   Patient ID: Jacqueline Min is a 78 y.o. female who presents for Medicare Annual Wellness Visit Subsequent.  Subjective   Patient ID: Jacqueline Min is a 78 y.o. female who presents for No chief complaint on file..  PMHX, PSHx, Fam hx, and Social hx reviewed.   New concerns  - has diffuse osteopenia on December DEXA. Does not want to tx with Rx, prefers OTC - taking Calc, Vit D and bone support supplement.  Vaccines  - delclines Flu shot, other vaccines current  Dentist seen at least yearly yes  Vision concerns none  Hearing concerns none  Diet is overall healthy.   Smoker - no  Alcohol use - none  Exercising 1-2 days per week.   Colonoscopy 2022  Mamm - declines  DEXA current, 2023    Pt has chronic HTN.  Pt is taking HCTZ. Tolerating well.  Exercising 1-2 days per week   Low sodium diet is usually being followed.   Is monitoring home blood pressures. Readings range 110-120s/70s.  Denies HA, vision changes or CP.     Pt has Dyslipidemia.   Lipid panel showed .  Currently taking Pravastatin and is tolerating well without muscle pains or weakness.     Pt has chronic, stable hypothyroidism.   Taking Synthroid  TSH was in good range.  Lipid panel is not well controlled.  Energy is good. Pt denies significant weight gain, low mood, constipation, or skin changes.   Medication is tolerated well without anxiety, tremors, palpitations, involuntary weight loss, heat intolerance or diarrhea.      She brings up occasional pills getting stuck. Infrequently occurs about monthly. She does not want to pursue EGD.        Review of Systems   Constitutional:  Negative for appetite change, chills, fatigue and fever.   HENT:  Negative for congestion, ear pain and sinus pressure.    Eyes:  Negative for visual disturbance.   Respiratory:  Negative for chest tightness, shortness of breath and wheezing.    Cardiovascular:  Negative for chest pain.   Gastrointestinal:  Positive for diarrhea. Negative for abdominal  "distention, abdominal pain and nausea.   Genitourinary:  Negative for difficulty urinating, dysuria and pelvic pain.   Musculoskeletal:  Negative for arthralgias and myalgias.   Skin:  Negative for rash.   Allergic/Immunologic: Negative for immunocompromised state.   Neurological:  Negative for light-headedness and headaches.   Hematological:  Negative for adenopathy. Does not bruise/bleed easily.   Psychiatric/Behavioral:  Negative for dysphoric mood and sleep disturbance. The patient is not nervous/anxious.    Medicare Wellness Billing Compliance Satisfied    *This is a visual tool to show completion of required items on the day of the visit. Green checks will only appear on the date of visit.    Review all medications by prescribing practitioner or clinical pharmacist (such as prescriptions, OTCs, herbal therapies and supplements) documented in the medical record    Past Medical, Surgical, and Family History reviewed and updated in chart    Tobacco Use Reviewed    Alcohol Use Reviewed    Illicit Drug Use Reviewed    PHQ2/9    Falls in Last Year Reviewed    Home Safety Risk Factors Reviewed    Cognitive Impairment Reviewed    Patient Self Assessment and Health Status    Current Diet Reviewed    Exercise Frequency    ADL - Hearing Impairment    ADL - Bathing    ADL - Dressing    ADL - Walks in Home    IADL - Managing Finances    IADL - Grocery Shopping    IADL - Taking Medications    IADL - Doing Housework        Objective   /76   Pulse 68   Resp 12   Ht 1.6 m (5' 3\")   Wt 62.1 kg (137 lb)   SpO2 97%   BMI 24.27 kg/m²    Physical Exam  Constitutional:       General: She is not in acute distress.     Appearance: Normal appearance. She is not ill-appearing.   HENT:      Head: Normocephalic and atraumatic.      Right Ear: Tympanic membrane, ear canal and external ear normal.      Left Ear: Tympanic membrane, ear canal and external ear normal.      Nose: Nose normal.      Mouth/Throat: "      Mouth: Mucous membranes are moist.      Pharynx: No oropharyngeal exudate or posterior oropharyngeal erythema.   Eyes:      Extraocular Movements: Extraocular movements intact.      Conjunctiva/sclera: Conjunctivae normal.      Pupils: Pupils are equal, round, and reactive to light.   Neck:      Vascular: No carotid bruit.   Cardiovascular:      Rate and Rhythm: Normal rate and regular rhythm.      Heart sounds: Normal heart sounds. No murmur heard.  Pulmonary:      Breath sounds: Normal breath sounds. No wheezing, rhonchi or rales.   Abdominal:      General: Bowel sounds are normal. There is no distension.      Palpations: Abdomen is soft. There is no mass.      Tenderness: There is no abdominal tenderness.   Musculoskeletal:         General: No swelling or deformity.      Cervical back: Neck supple. No tenderness.   Lymphadenopathy:      Cervical: No cervical adenopathy.   Skin:     General: Skin is warm and dry.      Findings: No lesion or rash.   Neurological:      Mental Status: She is alert and oriented to person, place, and time.      Sensory: No sensory deficit.      Motor: No weakness.      Coordination: Coordination normal.      Deep Tendon Reflexes: Reflexes normal.   Psychiatric:         Mood and Affect: Mood normal.         Behavior: Behavior normal.         Judgment: Judgment normal.       Assessment/Plan   Diagnoses and all orders for this visit:  Healthcare maintenance - Flu shot recommended other vaccines current. Labs reviewed and discussed. Colonoscopy current. DEXA current, declines treatment for OP.  Mammogram declined.  Benign essential HTN - very welll controlled, monitor  Elevated LDL cholesterol level - discussed evidence based cardiovacular risk reduction with statin. Do not recommend VitC/Adrian/herbal supplement. Will monitor with labs in 3 months.  PAD (peripheral artery disease) - statin recommended  Age related osteoporosis - DEXA current. Continue Calc/ Vit D   Diarrhea - stable,  may try Culturelle or Align in addition to Cholestyramine.    Follow up in 6months, 30mins

## 2024-02-22 NOTE — PROGRESS NOTES
"Subjective   Reason for Visit: Jacqueline Min is an 78 y.o. female here for a Medicare Wellness visit.     Past Medical, Surgical, and Family History reviewed and updated in chart.    Reviewed all medications by prescribing practitioner or clinical pharmacist (such as prescriptions, OTCs, herbal therapies and supplements) and documented in the medical record.    HPI    Patient Care Team:  Wang Ramirez MD as PCP - General  Wang Ramirez MD as PCP - [employer] PCP     Review of Systems    Objective   Vitals:  /76   Pulse 68   Resp 12   Ht 1.6 m (5' 3\")   Wt 62.1 kg (137 lb)   SpO2 97%   BMI 24.27 kg/m²       Physical Exam    Assessment/Plan   Problem List Items Addressed This Visit    None         " all other ROS negative except as per HPI

## 2024-02-22 NOTE — PROGRESS NOTES
Subjective   Patient ID: Jacqueline Min is a 78 y.o. female who presents for No chief complaint on file..  PMHX, PSHx, Fam hx, and Social hx reviewed.   New concerns  - has diffuse osteopenia on December DEXA. Does not want to tx with Rx, prefers OTC - taking Calc, Vit D and bone support supplement.  Vaccines  - delclines Flu shot, other vaccines current  Dentist seen at least yearly yes  Vision concerns none  Hearing concerns none  Diet is overall healthy.   Smoker - no  Alcohol use - none  Exercising 1-2 days per week.   Colonoscopy 2022  Mamm - declines  DEXA current, 2023    Pt has chronic HTN.  Pt is taking HCTZ. Tolerating well.  Exercising 1-2 days per week   Low sodium diet is usually being followed.   Is monitoring home blood pressures. Readings range 110-120s/70s.  Denies HA, vision changes or CP.     Pt has Dyslipidemia.   Lipid panel showed .  Currently taking Pravastatin and is tolerating well without muscle pains or weakness.     Pt has chronic, stable hypothyroidism.   Taking Synthroid  TSH was in good range.  Lipid panel is not well controlled.  Energy is good. Pt denies significant weight gain, low mood, constipation, or skin changes.   Medication is tolerated well without anxiety, tremors, palpitations, involuntary weight loss, heat intolerance or diarrhea.      She brings up occasional pills getting stuck. Infrequently occurs about monthly. She does not want to pursue EGD.    Review of Systems   Constitutional:  Negative for appetite change, chills, fatigue and fever.   HENT:  Negative for congestion, ear pain and sinus pressure.    Eyes:  Negative for visual disturbance.   Respiratory:  Negative for chest tightness, shortness of breath and wheezing.    Cardiovascular:  Negative for chest pain.   Gastrointestinal:  Negative for abdominal distention, abdominal pain, diarrhea and nausea.   Genitourinary:  Negative for difficulty urinating, dysuria and pelvic pain.   Musculoskeletal:  Negative  for arthralgias and myalgias.   Skin:  Negative for rash.   Allergic/Immunologic: Negative for immunocompromised state.   Neurological:  Negative for light-headedness and headaches.   Hematological:  Negative for adenopathy. Does not bruise/bleed easily.   Psychiatric/Behavioral:  Negative for dysphoric mood and sleep disturbance. The patient is not nervous/anxious.        Objective   There were no vitals taken for this visit.   Physical Exam  Constitutional:       General: She is not in acute distress.     Appearance: Normal appearance. She is not ill-appearing.   HENT:      Head: Normocephalic and atraumatic.      Right Ear: Tympanic membrane, ear canal and external ear normal.      Left Ear: Tympanic membrane, ear canal and external ear normal.      Nose: Nose normal.      Mouth/Throat:      Mouth: Mucous membranes are moist.      Pharynx: No oropharyngeal exudate or posterior oropharyngeal erythema.   Eyes:      Extraocular Movements: Extraocular movements intact.      Conjunctiva/sclera: Conjunctivae normal.      Pupils: Pupils are equal, round, and reactive to light.   Neck:      Vascular: No carotid bruit.   Cardiovascular:      Rate and Rhythm: Normal rate and regular rhythm.      Heart sounds: Normal heart sounds. No murmur heard.  Pulmonary:      Breath sounds: Normal breath sounds. No wheezing, rhonchi or rales.   Abdominal:      General: Bowel sounds are normal. There is no distension.      Palpations: Abdomen is soft. There is no mass.      Tenderness: There is no abdominal tenderness.   Musculoskeletal:         General: No swelling or deformity.      Cervical back: Neck supple. No tenderness.   Lymphadenopathy:      Cervical: No cervical adenopathy.   Skin:     General: Skin is warm and dry.      Findings: No lesion or rash.   Neurological:      Mental Status: She is alert and oriented to person, place, and time.      Sensory: No sensory deficit.      Motor: No weakness.      Coordination: Coordination  normal.      Deep Tendon Reflexes: Reflexes normal.   Psychiatric:         Mood and Affect: Mood normal.         Behavior: Behavior normal.         Judgment: Judgment normal.       Assessment/Plan   Diagnoses and all orders for this visit:  Healthcare maintenanceok  Benign essential HTN  Elevated LDL cholesterol level  Age related osteoporosis, unspecified pathological fracture presence  PAD (peripheral artery disease) (CMS/HCC)  Acquired hypothyroidism

## 2024-03-06 DIAGNOSIS — R19.7 DIARRHEA, UNSPECIFIED TYPE: ICD-10-CM

## 2024-03-06 RX ORDER — CHOLESTYRAMINE 4 G/9G
1 POWDER, FOR SUSPENSION ORAL EVERY OTHER DAY
Qty: 45 PACKET | Refills: 0 | Status: SHIPPED | OUTPATIENT
Start: 2024-03-06 | End: 2024-06-03

## 2024-04-29 ENCOUNTER — OFFICE VISIT (OUTPATIENT)
Dept: GASTROENTEROLOGY | Facility: CLINIC | Age: 79
End: 2024-04-29
Payer: MEDICARE

## 2024-04-29 VITALS — OXYGEN SATURATION: 96 % | WEIGHT: 138 LBS | HEART RATE: 71 BPM | BODY MASS INDEX: 24.45 KG/M2 | HEIGHT: 63 IN

## 2024-04-29 DIAGNOSIS — K52.831 COLLAGENOUS COLITIS: ICD-10-CM

## 2024-04-29 DIAGNOSIS — R19.7 DIARRHEA, UNSPECIFIED TYPE: Primary | ICD-10-CM

## 2024-04-29 PROCEDURE — 99213 OFFICE O/P EST LOW 20 MIN: CPT | Performed by: INTERNAL MEDICINE

## 2024-04-29 PROCEDURE — 1160F RVW MEDS BY RX/DR IN RCRD: CPT | Performed by: INTERNAL MEDICINE

## 2024-04-29 PROCEDURE — 1036F TOBACCO NON-USER: CPT | Performed by: INTERNAL MEDICINE

## 2024-04-29 PROCEDURE — 1123F ACP DISCUSS/DSCN MKR DOCD: CPT | Performed by: INTERNAL MEDICINE

## 2024-04-29 PROCEDURE — 1159F MED LIST DOCD IN RCRD: CPT | Performed by: INTERNAL MEDICINE

## 2024-04-29 RX ORDER — CHOLESTYRAMINE 4 G/9G
4 POWDER, FOR SUSPENSION ORAL DAILY
Qty: 360 UNSPECIFIED | Refills: 3 | Status: SHIPPED | OUTPATIENT
Start: 2024-04-29 | End: 2025-04-29

## 2024-04-29 NOTE — PROGRESS NOTES
REASON FOR VISIT:  follow-up collagenous colitis    HPI:  Jacqueline Min is a 78 y.o. female with a past medical history of collagenous colitis being evaluated in the office for follow-up.  Initially treated with budesonide but given recurrence after coming off budesonide has been managed on cholestyramine.  Now taking cholestyramine once every third day which is controlling her diarrhea symptoms.  No rectal bleeding.  Last colonoscopy 2 years ago.  No focal abdominal pain symptoms.          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/27/2021    Thyroid nodule    Osteopenia 07/18/2023    Other conditions influencing health status 08/21/2021    History of cough    Other specified disorders of bone density and structure, unspecified site 08/10/2020    Osteopenia    Pre-ulcerative calluses 07/18/2023    Prediabetes 07/18/2023    Prolonged capillary refill time 07/18/2023    Right shoulder pain 07/18/2023    Right wrist pain 07/18/2023    Stiffness of right wrist, not elsewhere classified 07/18/2023    Strain of left wrist 07/18/2023    Thyroid enlarged 07/18/2023    Thyroid nodule 07/18/2023    Tinea pedis of both feet 07/18/2023    Urine frequency 07/18/2023       PAST SURGICAL HISTORY  Past Surgical History:   Procedure Laterality Date    FOOT SURGERY  02/02/2016    Foot Surgery    ROTATOR CUFF REPAIR  02/02/2016    Rotator Cuff Repair    TOTAL HIP ARTHROPLASTY  03/28/2017    Hip Replacement    US GUIDED  THYROID BIOPSY  5/16/2016    US GUIDED THYROID BIOPSY 5/16/2016 [facility]       FAMILY HISTORY  Family History   Problem Relation Name Age of Onset    Alzheimer's disease Other      Heart attack Other         SOCIAL HISTORY  Social History     Tobacco Use    Smoking status: Never    Smokeless tobacco: Never   Substance Use Topics    Alcohol use: Never       REVIEW OF SYSTEMS  CONSTITUTIONAL: negative for fever, chills, fatigue, or unintentional weight loss,   HEENT: negative for icteric sclera, eye pain/redness, or changes in vision/hearing  RESPIRATORY: negative for cough, hemoptysis, wheezing, orthopnea, or dyspnea on exertion  CARDIOVASCULAR: negative for chest pain, palpitations, or syncope   GASTROINTESTINAL: as noted per HPI  GENITOURINARY: negative for dysuria, polyuria, incontinence, or hematuria  MUSCULOSKELETAL: negative for arthralgia, myalgia, or joint swelling/stiffness   INTEGUMENTARY/SKIN: negative jaundice, rash, or skin lesion  HEMATOLOGIC/LYMPHATIC: negative for prolonged bleeding, easy bruising, or swollen lymph nodes  ENDOCRINE: negative for cold/heat intolerance, polydipsia, polyuria, or goiter  NEUROLOGIC: negative for headaches, dizziness, tremor, or gait abnormality  PSYCHIATRIC: negative for anxiety, depression, personality changes, or sleep disturbances      A 10 point review of systems was completed and was otherwise negative.    ALLERGIES  Allergies   Allergen Reactions    Quinine Other    Keflex [Cephalexin] Rash       MEDICATIONS  Current Outpatient Medications   Medication Sig Dispense Refill    amoxicillin (Amoxil) 500 mg capsule Take 4 capsules (2,000 mg) by mouth 1 time if needed (1 hour before dental procedure) for up to 1 dose. 4 capsule 2    ascorbic acid (Vitamin C) 1,000 mg tablet Take 1 tablet (1,000 mg) by mouth once daily.      calcium carbonate 600 mg calcium (1,500 mg) tablet Take 1 tablet (1,500 mg) by mouth once daily.      cholecalciferol (Vitamin D-3) 50 mcg  "(2,000 unit) capsule Take by mouth.      cholesterol control,high,low solution Vit C 50mg- [place] Orange extract 500mg- Tocotrienol 10mg      cholestyramine (Questran) 4 gram packet Take 1 packet (4 g) by mouth every other day. 45 packet 0    coenzyme Q-10 (CoQ-10) 100 mg capsule Take 1 capsule (100 mg) by mouth once daily.      hydroCHLOROthiazide (HYDRODiuril) 12.5 mg tablet Take 1 tablet (12.5 mg) by mouth once daily in the morning. Take before meals. 90 tablet 1    levothyroxine (Synthroid, Levoxyl) 75 mcg tablet Take 1 tablet (75 mcg) by mouth once daily. 90 tablet 1    magnesium oxide 400 mg magnesium capsule Take 1 capsule (400 mg) by mouth once daily.      omega 3-dha-epa-fish oil (Fish OiL) 1,000 mg (120 mg-180 mg) capsule Take by mouth.      tiZANidine (Zanaflex) 4 mg tablet TAKE 1 TABLET AT BEDTIME FOR MUSCLE SPASM(S) AS NEEDED 90 tablet 0    vitamin K2 100 mcg capsule Take 1 capsule (100 mcg) by mouth once daily.      cholestyramine (Questran) 4 gram powder Take 1 packet (4 g) by mouth once daily. Dissolve in 8 oz of liquid and drink before a meal 360 Unspecified 3     No current facility-administered medications for this visit.       VITALS  Pulse 71   Ht 1.6 m (5' 3\")   Wt 62.6 kg (138 lb)   SpO2 96%   BMI 24.45 kg/m²      PHYSICAL EXAM  Alert and oriented in no acute distress    ASSESSMENT/ PLAN  Patient with collagenous colitis controlled on cholestyramine once every third day.  Will continue current therapy.  In the past budesonide control symptoms however diarrhea recurred once weaned off budesonide.  Will continue current therapy with cholestyramine and she will see me back in the office as needed.  She is in agreement with the plan.        Signature: Janell Stroud MD    Date: 4/29/2024  Time: 3:41 PM    "

## 2024-06-01 DIAGNOSIS — R19.7 DIARRHEA, UNSPECIFIED TYPE: ICD-10-CM

## 2024-06-03 RX ORDER — CHOLESTYRAMINE 4 G/9G
1 POWDER, FOR SUSPENSION ORAL EVERY OTHER DAY
Qty: 45 PACKET | Refills: 2 | Status: SHIPPED | OUTPATIENT
Start: 2024-06-03

## 2024-07-31 DIAGNOSIS — I10 BENIGN ESSENTIAL HTN: ICD-10-CM

## 2024-07-31 DIAGNOSIS — E03.9 ACQUIRED HYPOTHYROIDISM: ICD-10-CM

## 2024-08-06 RX ORDER — LEVOTHYROXINE SODIUM 75 UG/1
75 TABLET ORAL DAILY
Qty: 90 TABLET | Refills: 0 | Status: SHIPPED | OUTPATIENT
Start: 2024-08-06

## 2024-08-06 RX ORDER — HYDROCHLOROTHIAZIDE 12.5 MG/1
12.5 TABLET ORAL
Qty: 90 TABLET | Refills: 0 | Status: SHIPPED | OUTPATIENT
Start: 2024-08-06

## 2024-08-31 ENCOUNTER — LAB (OUTPATIENT)
Dept: LAB | Facility: LAB | Age: 79
End: 2024-08-31
Payer: MEDICARE

## 2024-08-31 DIAGNOSIS — E78.00 ELEVATED LDL CHOLESTEROL LEVEL: ICD-10-CM

## 2024-08-31 DIAGNOSIS — E03.9 ACQUIRED HYPOTHYROIDISM: ICD-10-CM

## 2024-08-31 LAB — TSH SERPL-ACNC: 0.86 MIU/L (ref 0.44–3.98)

## 2024-08-31 PROCEDURE — 80053 COMPREHEN METABOLIC PANEL: CPT

## 2024-08-31 PROCEDURE — 84443 ASSAY THYROID STIM HORMONE: CPT

## 2024-08-31 PROCEDURE — 36415 COLL VENOUS BLD VENIPUNCTURE: CPT

## 2024-08-31 PROCEDURE — 80061 LIPID PANEL: CPT

## 2024-09-01 DIAGNOSIS — R74.8 ELEVATED ALKALINE PHOSPHATASE LEVEL: Primary | ICD-10-CM

## 2024-09-01 LAB
ALBUMIN SERPL BCP-MCNC: 4.1 G/DL (ref 3.4–5)
ALP SERPL-CCNC: 143 U/L (ref 33–136)
ALT SERPL W P-5'-P-CCNC: 16 U/L (ref 7–45)
ANION GAP SERPL CALC-SCNC: 14 MMOL/L (ref 10–20)
AST SERPL W P-5'-P-CCNC: 18 U/L (ref 9–39)
BILIRUB SERPL-MCNC: 0.6 MG/DL (ref 0–1.2)
BUN SERPL-MCNC: 17 MG/DL (ref 6–23)
CALCIUM SERPL-MCNC: 9.8 MG/DL (ref 8.6–10.6)
CHLORIDE SERPL-SCNC: 98 MMOL/L (ref 98–107)
CHOLEST SERPL-MCNC: 277 MG/DL (ref 0–199)
CHOLESTEROL/HDL RATIO: 4.5
CO2 SERPL-SCNC: 32 MMOL/L (ref 21–32)
CREAT SERPL-MCNC: 0.83 MG/DL (ref 0.5–1.05)
EGFRCR SERPLBLD CKD-EPI 2021: 71 ML/MIN/1.73M*2
GLUCOSE SERPL-MCNC: 99 MG/DL (ref 74–99)
HDLC SERPL-MCNC: 61.1 MG/DL
LDLC SERPL CALC-MCNC: 195 MG/DL
NON HDL CHOLESTEROL: 216 MG/DL (ref 0–149)
POTASSIUM SERPL-SCNC: 4 MMOL/L (ref 3.5–5.3)
PROT SERPL-MCNC: 6.9 G/DL (ref 6.4–8.2)
SODIUM SERPL-SCNC: 140 MMOL/L (ref 136–145)
TRIGL SERPL-MCNC: 106 MG/DL (ref 0–149)
VLDL: 21 MG/DL (ref 0–40)

## 2024-09-02 DIAGNOSIS — R74.8 ELEVATED ALKALINE PHOSPHATASE LEVEL: Primary | ICD-10-CM

## 2024-09-05 ENCOUNTER — APPOINTMENT (OUTPATIENT)
Dept: PRIMARY CARE | Facility: CLINIC | Age: 79
End: 2024-09-05
Payer: MEDICARE

## 2024-09-05 ENCOUNTER — LAB (OUTPATIENT)
Dept: LAB | Facility: LAB | Age: 79
End: 2024-09-05
Payer: MEDICARE

## 2024-09-05 VITALS
BODY MASS INDEX: 25.16 KG/M2 | DIASTOLIC BLOOD PRESSURE: 66 MMHG | RESPIRATION RATE: 12 BRPM | HEART RATE: 80 BPM | HEIGHT: 63 IN | SYSTOLIC BLOOD PRESSURE: 124 MMHG | OXYGEN SATURATION: 96 % | WEIGHT: 142 LBS

## 2024-09-05 DIAGNOSIS — I73.9 PAD (PERIPHERAL ARTERY DISEASE) (CMS-HCC): ICD-10-CM

## 2024-09-05 DIAGNOSIS — E78.00 ELEVATED LDL CHOLESTEROL LEVEL: ICD-10-CM

## 2024-09-05 DIAGNOSIS — R74.8 HIGH ALKALINE PHOSPHATASE: ICD-10-CM

## 2024-09-05 DIAGNOSIS — M81.6 LOCALIZED OSTEOPOROSIS WITHOUT CURRENT PATHOLOGICAL FRACTURE: ICD-10-CM

## 2024-09-05 DIAGNOSIS — E03.9 ACQUIRED HYPOTHYROIDISM: ICD-10-CM

## 2024-09-05 DIAGNOSIS — I10 BENIGN ESSENTIAL HTN: Primary | ICD-10-CM

## 2024-09-05 PROCEDURE — 84080 ASSAY ALKALINE PHOSPHATASES: CPT

## 2024-09-05 PROCEDURE — 3074F SYST BP LT 130 MM HG: CPT | Performed by: FAMILY MEDICINE

## 2024-09-05 PROCEDURE — 3078F DIAST BP <80 MM HG: CPT | Performed by: FAMILY MEDICINE

## 2024-09-05 PROCEDURE — 36415 COLL VENOUS BLD VENIPUNCTURE: CPT

## 2024-09-05 PROCEDURE — 1036F TOBACCO NON-USER: CPT | Performed by: FAMILY MEDICINE

## 2024-09-05 PROCEDURE — 99214 OFFICE O/P EST MOD 30 MIN: CPT | Performed by: FAMILY MEDICINE

## 2024-09-05 PROCEDURE — 1123F ACP DISCUSS/DSCN MKR DOCD: CPT | Performed by: FAMILY MEDICINE

## 2024-09-05 PROCEDURE — 1159F MED LIST DOCD IN RCRD: CPT | Performed by: FAMILY MEDICINE

## 2024-09-05 RX ORDER — LEVOTHYROXINE SODIUM 75 UG/1
75 TABLET ORAL DAILY
Qty: 90 TABLET | Refills: 1 | Status: SHIPPED | OUTPATIENT
Start: 2024-09-05

## 2024-09-05 RX ORDER — HYDROCHLOROTHIAZIDE 12.5 MG/1
12.5 TABLET ORAL
Qty: 90 TABLET | Refills: 1 | Status: SHIPPED | OUTPATIENT
Start: 2024-09-05

## 2024-09-05 ASSESSMENT — ENCOUNTER SYMPTOMS
DYSPHORIC MOOD: 0
SLEEP DISTURBANCE: 0
DYSURIA: 0
SHORTNESS OF BREATH: 0
FATIGUE: 0
APPETITE CHANGE: 0
ADENOPATHY: 0
MYALGIAS: 0
ABDOMINAL DISTENTION: 0
SINUS PRESSURE: 0
ABDOMINAL PAIN: 0
ARTHRALGIAS: 0
CHILLS: 0
DIARRHEA: 0
DIFFICULTY URINATING: 0
LIGHT-HEADEDNESS: 0
FEVER: 0
NERVOUS/ANXIOUS: 0
CHEST TIGHTNESS: 0
WHEEZING: 0
BRUISES/BLEEDS EASILY: 0
HEADACHES: 0
NAUSEA: 0

## 2024-09-05 NOTE — PROGRESS NOTES
"Subjective   Patient ID: Jacqueline Min is a 78 y.o. female who presents for Follow-up.  Pt has chronic HTN.  Pt is taking HCTZ. Tolerating well.  Exercising 4-5 days per week   Low sodium diet is being followed.   Is not monitoring home blood pressures.  Denies HA, vision changes or CP.     Pt has chronic, stable hypothyroidism.   Taking Synthroid  TSH is in good range.  Lipid panel is well controlled.  Energy is good. Pt denies significant weight gain, low mood, constipation, or skin changes.   Medication is tolerated well without anxiety, tremors, palpitations, involuntary weight loss, heat intolerance or diarrhea.     She's had more frequency of urination over the past 1.5weeks. She attributes to drinking more water. No UTI symptoms.    Pt has Dyslipidemia.   Lipid panel showed .  Currently taking no statin, using OTC supplement.      Labs showed mildly elevated Alk phos.        Review of Systems   Constitutional:  Negative for appetite change, chills, fatigue and fever.   HENT:  Negative for congestion, ear pain and sinus pressure.    Eyes:  Negative for visual disturbance.   Respiratory:  Negative for chest tightness, shortness of breath and wheezing.    Cardiovascular:  Negative for chest pain.   Gastrointestinal:  Negative for abdominal distention, abdominal pain, diarrhea and nausea.   Genitourinary:  Negative for difficulty urinating, dysuria and pelvic pain.   Musculoskeletal:  Negative for arthralgias and myalgias.   Skin:  Negative for rash.   Allergic/Immunologic: Negative for immunocompromised state.   Neurological:  Negative for light-headedness and headaches.   Hematological:  Negative for adenopathy. Does not bruise/bleed easily.   Psychiatric/Behavioral:  Negative for dysphoric mood and sleep disturbance. The patient is not nervous/anxious.        Objective   /66   Pulse 80   Resp 12   Ht 1.6 m (5' 3\")   Wt 64.4 kg (142 lb)   SpO2 96%   BMI 25.15 kg/m²    Physical " Exam  Constitutional:       General: She is not in acute distress.     Appearance: Normal appearance.   Cardiovascular:      Rate and Rhythm: Normal rate and regular rhythm.      Heart sounds: Normal heart sounds. No murmur heard.  Pulmonary:      Effort: Pulmonary effort is normal.      Breath sounds: Normal breath sounds.   Abdominal:      Palpations: Abdomen is soft.      Tenderness: There is no abdominal tenderness.   Neurological:      Mental Status: She is alert.   Psychiatric:         Mood and Affect: Mood normal.         Judgment: Judgment normal.       Assessment/Plan   Diagnoses and all orders for this visit:  Benign essential HTN - doing well, continue current dose on HCTZ  Acquired hypothyroidism - stable, continue Synthroid at current dose.  Elevated LDL cholesterol level - discussed and recommended statin, she'll consider.  Localized osteoporosis  - doing well with Calc/Vit D declines prescription treatment, will recheck DEXA in December  PAD (peripheral artery disease)  - asymptomatic, monitor  Follow up in 6 months, 30mins for physical

## 2024-09-05 NOTE — PROGRESS NOTES
"Subjective   Patient ID: Jacqueline Min is a 78 y.o. female who presents for Follow-up.    HPI     Review of Systems    Objective   /66   Pulse 80   Resp 12   Ht 1.6 m (5' 3\")   Wt 64.4 kg (142 lb)   SpO2 96%   BMI 25.15 kg/m²     Physical Exam    Assessment/Plan          "

## 2024-09-08 LAB
ALP BONE SERPL-CCNC: 81 U/L (ref 0–55)
ALP LIVER SERPL-CCNC: 81 U/L (ref 0–94)
ALP OTHER SERPL-CCNC: 0 U/L
ALP SERPL-CCNC: 162 U/L (ref 40–120)

## 2024-09-09 PROBLEM — R74.8 ELEVATED ALKALINE PHOSPHATASE LEVEL: Status: ACTIVE | Noted: 2024-09-09

## 2024-09-12 ENCOUNTER — TELEPHONE (OUTPATIENT)
Dept: PRIMARY CARE | Facility: CLINIC | Age: 79
End: 2024-09-12
Payer: MEDICARE

## 2024-09-12 NOTE — TELEPHONE ENCOUNTER
----- Message from Wang Ramirez sent at 9/9/2024  7:03 AM EDT -----  Alk phos bone subtype is elevated. Will monitor with repeat alk phos level in 1 month and if remains elevated would futher evaluate with bone scan.  ----- Message -----  From: Lab, Background User  Sent: 9/9/2024   1:53 AM EDT  To: Wang Ramirez MD

## 2025-01-02 DIAGNOSIS — R19.7 DIARRHEA, UNSPECIFIED TYPE: ICD-10-CM

## 2025-01-02 RX ORDER — CHOLESTYRAMINE 4 G/9G
4 POWDER, FOR SUSPENSION ORAL
Qty: 90 G | Refills: 1 | Status: SHIPPED | OUTPATIENT
Start: 2025-01-02 | End: 2026-01-02

## 2025-01-08 DIAGNOSIS — E03.9 ACQUIRED HYPOTHYROIDISM: ICD-10-CM

## 2025-01-08 DIAGNOSIS — I10 BENIGN ESSENTIAL HTN: ICD-10-CM

## 2025-01-08 RX ORDER — HYDROCHLOROTHIAZIDE 12.5 MG/1
12.5 TABLET ORAL
Qty: 90 TABLET | Refills: 0 | Status: SHIPPED | OUTPATIENT
Start: 2025-01-08

## 2025-01-08 RX ORDER — LEVOTHYROXINE SODIUM 75 UG/1
75 TABLET ORAL DAILY
Qty: 90 TABLET | Refills: 0 | Status: SHIPPED | OUTPATIENT
Start: 2025-01-08

## 2025-01-27 ENCOUNTER — TELEPHONE (OUTPATIENT)
Dept: PRIMARY CARE | Facility: CLINIC | Age: 80
End: 2025-01-27
Payer: MEDICARE

## 2025-01-27 NOTE — TELEPHONE ENCOUNTER
----- Message from Wang Ramirez sent at 1/27/2025 12:18 PM EST -----  No peer to peer needed. She can do it at 2yrs  ----- Message -----  From: Tara Lobo LPN  Sent: 1/27/2025  12:01 PM EST  To: Wang Ramirez MD    [employer] states the dexa needs an approval to get it sooner than 2yrs. You can call [phone] for peer to peer or pt will have to wait. Please advise.

## 2025-03-05 ENCOUNTER — TELEPHONE (OUTPATIENT)
Dept: PRIMARY CARE | Facility: CLINIC | Age: 80
End: 2025-03-05
Payer: COMMERCIAL

## 2025-03-05 NOTE — TELEPHONE ENCOUNTER
Pt has an appt this Friday. She would like to have her labs done before she comes in. It is dated for September. Can you change the date so she can have them done?

## 2025-03-07 ENCOUNTER — APPOINTMENT (OUTPATIENT)
Dept: PRIMARY CARE | Facility: CLINIC | Age: 80
End: 2025-03-07
Payer: MEDICARE

## 2025-03-07 VITALS
HEART RATE: 76 BPM | OXYGEN SATURATION: 98 % | WEIGHT: 140 LBS | BODY MASS INDEX: 23.32 KG/M2 | RESPIRATION RATE: 12 BRPM | DIASTOLIC BLOOD PRESSURE: 72 MMHG | HEIGHT: 65 IN | SYSTOLIC BLOOD PRESSURE: 134 MMHG

## 2025-03-07 DIAGNOSIS — E03.9 ACQUIRED HYPOTHYROIDISM: ICD-10-CM

## 2025-03-07 DIAGNOSIS — Z00.00 HEALTHCARE MAINTENANCE: Primary | ICD-10-CM

## 2025-03-07 DIAGNOSIS — I10 BENIGN ESSENTIAL HTN: ICD-10-CM

## 2025-03-07 DIAGNOSIS — I73.9 PAD (PERIPHERAL ARTERY DISEASE) (CMS-HCC): ICD-10-CM

## 2025-03-07 DIAGNOSIS — M81.0 AGE RELATED OSTEOPOROSIS, UNSPECIFIED PATHOLOGICAL FRACTURE PRESENCE: ICD-10-CM

## 2025-03-07 DIAGNOSIS — S81.801A WOUND OF RIGHT LOWER EXTREMITY, INITIAL ENCOUNTER: ICD-10-CM

## 2025-03-07 DIAGNOSIS — E78.00 ELEVATED LDL CHOLESTEROL LEVEL: ICD-10-CM

## 2025-03-07 DIAGNOSIS — R74.8 ELEVATED ALKALINE PHOSPHATASE LEVEL: ICD-10-CM

## 2025-03-07 PROCEDURE — 1036F TOBACCO NON-USER: CPT | Performed by: FAMILY MEDICINE

## 2025-03-07 PROCEDURE — 1160F RVW MEDS BY RX/DR IN RCRD: CPT | Performed by: FAMILY MEDICINE

## 2025-03-07 PROCEDURE — 1170F FXNL STATUS ASSESSED: CPT | Performed by: FAMILY MEDICINE

## 2025-03-07 PROCEDURE — 1159F MED LIST DOCD IN RCRD: CPT | Performed by: FAMILY MEDICINE

## 2025-03-07 PROCEDURE — 1158F ADVNC CARE PLAN TLK DOCD: CPT | Performed by: FAMILY MEDICINE

## 2025-03-07 PROCEDURE — 99214 OFFICE O/P EST MOD 30 MIN: CPT | Performed by: FAMILY MEDICINE

## 2025-03-07 PROCEDURE — G0439 PPPS, SUBSEQ VISIT: HCPCS | Performed by: FAMILY MEDICINE

## 2025-03-07 PROCEDURE — 1123F ACP DISCUSS/DSCN MKR DOCD: CPT | Performed by: FAMILY MEDICINE

## 2025-03-07 PROCEDURE — 3078F DIAST BP <80 MM HG: CPT | Performed by: FAMILY MEDICINE

## 2025-03-07 PROCEDURE — 3075F SYST BP GE 130 - 139MM HG: CPT | Performed by: FAMILY MEDICINE

## 2025-03-07 RX ORDER — HYDROCHLOROTHIAZIDE 12.5 MG/1
12.5 TABLET ORAL
Qty: 100 TABLET | Refills: 1 | Status: SHIPPED | OUTPATIENT
Start: 2025-03-07 | End: 2025-03-07 | Stop reason: SDUPTHER

## 2025-03-07 RX ORDER — LEVOTHYROXINE SODIUM 75 UG/1
75 TABLET ORAL DAILY
Qty: 100 TABLET | Refills: 1 | Status: SHIPPED | OUTPATIENT
Start: 2025-03-07 | End: 2025-03-07 | Stop reason: SDUPTHER

## 2025-03-07 RX ORDER — HYDROCHLOROTHIAZIDE 12.5 MG/1
12.5 TABLET ORAL
Qty: 100 TABLET | Refills: 1 | Status: SHIPPED | OUTPATIENT
Start: 2025-03-07

## 2025-03-07 RX ORDER — LEVOTHYROXINE SODIUM 75 UG/1
75 TABLET ORAL DAILY
Qty: 100 TABLET | Refills: 1 | Status: SHIPPED | OUTPATIENT
Start: 2025-03-07

## 2025-03-07 ASSESSMENT — ENCOUNTER SYMPTOMS
SHORTNESS OF BREATH: 0
WOUND: 1
ABDOMINAL PAIN: 0
FATIGUE: 0
DYSPHORIC MOOD: 0
SLEEP DISTURBANCE: 0
SINUS PRESSURE: 0
ABDOMINAL DISTENTION: 0
DYSURIA: 0
DEPRESSION: 0
FEVER: 0
APPETITE CHANGE: 0
ADENOPATHY: 0
DIARRHEA: 0
LOSS OF SENSATION IN FEET: 0
HEADACHES: 0
BRUISES/BLEEDS EASILY: 0
NAUSEA: 0
ARTHRALGIAS: 1
NERVOUS/ANXIOUS: 0
CHILLS: 0
WHEEZING: 0
CHEST TIGHTNESS: 0
DIFFICULTY URINATING: 0
OCCASIONAL FEELINGS OF UNSTEADINESS: 0
MYALGIAS: 0
LIGHT-HEADEDNESS: 0

## 2025-03-07 ASSESSMENT — PATIENT HEALTH QUESTIONNAIRE - PHQ9
4. FEELING TIRED OR HAVING LITTLE ENERGY: NOT AT ALL
1. LITTLE INTEREST OR PLEASURE IN DOING THINGS: NOT AT ALL
2. FEELING DOWN, DEPRESSED OR HOPELESS: NOT AT ALL
7. TROUBLE CONCENTRATING ON THINGS, SUCH AS READING THE NEWSPAPER OR WATCHING TELEVISION: NOT AT ALL
SUM OF ALL RESPONSES TO PHQ9 QUESTIONS 1 AND 2: 0
8. MOVING OR SPEAKING SO SLOWLY THAT OTHER PEOPLE COULD HAVE NOTICED. OR THE OPPOSITE, BEING SO FIGETY OR RESTLESS THAT YOU HAVE BEEN MOVING AROUND A LOT MORE THAN USUAL: NOT AT ALL
5. POOR APPETITE OR OVEREATING: NOT AT ALL
SUM OF ALL RESPONSES TO PHQ QUESTIONS 1-9: 0
3. TROUBLE FALLING OR STAYING ASLEEP OR SLEEPING TOO MUCH: NOT AT ALL
10. IF YOU CHECKED OFF ANY PROBLEMS, HOW DIFFICULT HAVE THESE PROBLEMS MADE IT FOR YOU TO DO YOUR WORK, TAKE CARE OF THINGS AT HOME, OR GET ALONG WITH OTHER PEOPLE: NOT DIFFICULT AT ALL
9. THOUGHTS THAT YOU WOULD BE BETTER OFF DEAD, OR OF HURTING YOURSELF: NOT AT ALL
6. FEELING BAD ABOUT YOURSELF - OR THAT YOU ARE A FAILURE OR HAVE LET YOURSELF OR YOUR FAMILY DOWN: NOT AT ALL

## 2025-03-07 ASSESSMENT — ACTIVITIES OF DAILY LIVING (ADL)
MANAGING_FINANCES: INDEPENDENT
TAKING_MEDICATION: INDEPENDENT
BATHING: INDEPENDENT
DRESSING: INDEPENDENT
GROCERY_SHOPPING: INDEPENDENT
DOING_HOUSEWORK: INDEPENDENT
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
## 2025-03-07 NOTE — PROGRESS NOTES
"Subjective   Reason for Visit: Jacqueline Min is an 79 y.o. female here for a Medicare Wellness visit.     Past Medical, Surgical, and Family History reviewed and updated in chart.    Reviewed all medications by prescribing practitioner or clinical pharmacist (such as prescriptions, OTCs, herbal therapies and supplements) and documented in the medical record.    HPI    Patient Care Team:  Wang Ramirez MD as PCP - General  Wang Ramirez MD as PCP - [employer] PCP  Wang Ramirez MD as PCP - [facility] [employer] PCP     Review of Systems    Objective   Vitals:  /72   Pulse 76   Resp 12   Ht 1.651 m (5' 5\")   Wt 63.5 kg (140 lb)   SpO2 98%   BMI 23.30 kg/m²       Physical Exam    Assessment & Plan  Acquired hypothyroidism         Benign essential HTN                   "

## 2025-03-11 LAB
ALP BONE CFR SERPL: 46 % (ref 28–66)
ALP INTEST CFR SERPL: 8 % (ref 1–24)
ALP LIVER CFR SERPL: 46 % (ref 25–69)
ALP MACROHEPATIC CFR SERPL: 0 %
ALP PLAC CFR SERPL: 0 %
ALP SERPL-CCNC: 107 U/L (ref 37–153)

## 2025-03-19 ENCOUNTER — TELEPHONE (OUTPATIENT)
Dept: PRIMARY CARE | Facility: CLINIC | Age: 80
End: 2025-03-19
Payer: COMMERCIAL

## 2025-03-19 NOTE — TELEPHONE ENCOUNTER
----- Message from Wang Ramirez sent at 3/11/2025  8:56 PM EDT -----    Alk phos level improved, keep plan to see rheumatology for this issue and osteoporosis  ----- Message -----  From: [facility] Results In  Sent: 3/11/2025   8:13 PM EDT  To: Wang Ramirez MD

## 2025-04-11 DIAGNOSIS — R19.7 DIARRHEA, UNSPECIFIED TYPE: ICD-10-CM

## 2025-04-11 RX ORDER — CHOLESTYRAMINE 4 G/9G
1 POWDER, FOR SUSPENSION ORAL EVERY OTHER DAY
Qty: 45 PACKET | Refills: 0 | Status: SHIPPED | OUTPATIENT
Start: 2025-04-11

## 2025-04-28 NOTE — PROGRESS NOTES
79 y.o. woman with PMH of HTN, hypothyroidism, polyarticular OA, who was referred by PCP for elevated ALP and osteoporosis.    CHIEF COMPLAINT:  elevated ALP and osteoporosis    Current rheum meds:  Vitamin D3 20 mcg + 1000 international units daily  Calcium 300 mg daily    Prior rheum meds:  Prolia - 2 shots only , unknown when or why stopped  Fosamax - 4552-4059, stopped due to ?leg cramps  Ibandronate and Risedronate - duration unknown and reason for stopping unknown    HPI:   Saw rheum in the past once 2018, for osteopenia  On review of previous PCP notes, patient did have previous DEXA with -2.5 T-score L-spine, consistent with osteoporosis  Patient recently with elevated ALP, including bone specific- resolved on repeat  No unintentional weight loss, skull pain, generalized pain, night sweats  Does have some R groin pain    Patient recalls being on Fosamax, unsure when, and unsure why stopped, things due to ?leg cramps  Review of previous records show Fosamax use 6584-0461  Review of previous records also show Ibandronate and Risedronate prior to 2018, unclear why stopped or total duration    Last DEXA 12/2023 - lowest T-score -2.2 LFN and L-spine,  FRAX 19%, 4.0% when calculated with fragility fracture  FINDINGS:  SPINE L1-L4  Bone Mineral Density: 0.915  T-Score -2.2  Z-Score -0.4  Bone Mineral Density change vs baseline:  -1.5%  Bone Mineral Density change vs previous: 1.3%      LEFT FEMUR -TOTAL  Bone Mineral Density: 0.743  T-Score -2.1   Z-Score  -0.1  Bone Mineral Density change vs baseline: -1.7%  Bone Mineral Density change vs previous: 3.5%      LEFT FEMUR -NECK  Bone Mineral Density: 0.734  T-Score -2.2  Z-Score 0.0    6/2022 DEXA  Left Femur Neck :    . Bone Density: 0.709 g/cm2 . . T Score: -2.4 . . Z Score: -0.3 . .   WHO Classification: Osteopenia . . % Change: -2.5% *     Left Femur Total :    . Bone Density: 0.735 g/cm2 . . T Score: -2.2 . . Z Score: -0.3 . .   WHO Classification: Osteopenia .  . % Change: -5.0%*     AP Spine L1-L4 :   . Bone Density: 0.903 g/cm2 . . T Score: -2.3 . . Z Score: -0.6 . .   WHO Classification: Osteopenia . . % Change: -4.0% *  The patient is doing well  Complaints with med  No side effects reported    History of falls / fractures: 9/2022 fell, broke R wrist, GLF  - distal radial fracture and ulnar styloid fracture with mild displacement  Loss of height: yes - thinks lost 1 inch in height  Tobacco: no  Alcohol: no  Vitamin D supplementation: yes -20 mcg and 1000 international units daily  Calcium supplementation: yes - 300 mg and from diet  Weight bearing exercise: swims, walks. Not doing weightlifting  Previous or planned invasive jaw surgery: no  History of radiation: no  Chronic steroid use: no  History of RA: no  GERD: no  Cash-en-y: no  CKD / GFR <30: no  Parental hip fracture: no      REVIEW OF SYSTEMS:  10 point ROS negative except as per HPI    Problem List[1]      Medical History[2]         Surgical History[3]    RX Allergies[4]    Medication Documentation Review Audit       Reviewed by Shabana Jaime MA (Medical Assistant) on 04/29/25 at 1058      Medication Order Taking? Sig Documenting Provider Last Dose Status   amoxicillin (Amoxil) 500 mg capsule [id number] Yes Take 4 capsules (2,000 mg) by mouth 1 time if needed (1 hour before dental procedure) for up to 1 dose. Wang Ramirez MD  Active   ascorbic acid (Vitamin C) 1,000 mg tablet [id number] Yes Take 1 tablet (1,000 mg) by mouth once daily. Historical Provider, MD  Active   calcium carbonate 600 mg calcium (1,500 mg) tablet [id number] Yes Take 1 tablet (1,500 mg) by mouth once daily. Historical Provider, MD  Active   cholecalciferol (Vitamin D-3) 50 mcg (2,000 unit) capsule [id number] Yes Take by mouth. Historical Provider, MD  Active   cholesterol control,high,low solution [id number] Yes Vit C 50mg- [place] Orange extract 500mg- Tocotrienol 10mg Historical Provider, MD  Active   cholestyramine (Questran) 4 gram  packet [id number]  Take 1 packet (4 g) by mouth every other day. MUST MAKE APPT FOR FURTHER REFILLS   Patient not taking: Reported on 4/29/2025    Janell Stroud MD  Active   coenzyme Q-10 (CoQ-10) 100 mg capsule [id number] Yes Take 1 capsule (100 mg) by mouth once daily. Historical Provider, MD  Active   hydroCHLOROthiazide (Microzide) 12.5 mg tablet [id number] Yes Take 1 tablet (12.5 mg) by mouth once daily in the morning. Take before meals. Wang Ramirez MD  Active   levothyroxine (Synthroid, Levoxyl) 75 mcg tablet [id number] Yes Take 1 tablet (75 mcg) by mouth once daily. Wang Ramirez MD  Active   magnesium oxide 400 mg magnesium capsule [id number] Yes Take 1 capsule (400 mg) by mouth once daily. Historical Provider, MD  Active   omega 3-dha-epa-fish oil (Fish OiL) 1,000 mg (120 mg-180 mg) capsule [id number] Yes Take by mouth. Historical Provider, MD  Active   tiZANidine (Zanaflex) 4 mg tablet [id number] Yes TAKE 1 TABLET AT BEDTIME FOR MUSCLE SPASM(S) AS NEEDED Wang Ramirez MD  Active   vitamin K2 100 mcg capsule [id number] Yes Take 1 capsule (100 mcg) by mouth once daily. Historical Provider, MD  Active                        Family History[5]       Social History[6]        Vitals:    04/29/25 1054   BP: 112/63   Pulse: 73   Temp: 36.3 °C (97.3 °F)   SpO2: 95%       PHYSICAL EXAM:  General - NAD, sitting up in chair, well-groomed, pleasant, AAOx3  Head: Normocephalic, atraumatic  Eyes - PERRLA, EOMI. No conjunctiva injection.   Mouth/ENT - Moist oral and nasal mucosa. No facial rash  Cardiovascular - Normal S1, S2. Regular rate and rhythm. No murmurs or rubs.  Lungs - Symmetric chest expansion. Clear to auscultation bilaterally.   Skin - No rashes or ulcers. Skin warm and dry. No erythema on bilateral cheeks.  Extremities - No edema, cyanosis ,or clubbing  Neurological - Alert and oriented x 3,  grossly intact. No focal deficit.    Musculoskeletal -  Shoulders: Full ROM, without pain, no swelling,  warmth or tenderness.  Elbows: Full ROM, without pain, no swelling, warmth or tenderness.  Wrists: Full ROM, without pain, no swelling, warmth or tenderness.  MCP: No swelling, warmth or tenderness. Metacarpal squeeze negative  PIP: No swelling, warmth or tenderness.  DIP: No swelling, warmth or tenderness.  Hands : 5/5.    Hips: Full ROM.  No malalignment.  Knees:  Full ROM, without pain, no swelling, warmth or point tenderness. No joint line tenderness, no pes anserine tenderness.  Ankles: Full ROM, without pain, swelling, warmth or tenderness.  Toes: No swelling, warmth or tenderness. Metatarsal squeeze negative  Cervical spine: No tenderness or limitation of movement  Lumbar spine: No tenderness or limitation of movement          XR DEXA bone density 12/18/2023 [id number] Final       Assessment/Plan   79 y.o. woman with PMH of HTN, hypothyroidism, polyarticular OA, who was referred by PCP for elevated ALP and osteoporosis.    Osteopenia with recent fragility fracture  History of osteoporosis  Last DEXA 12/2023 - lowest T-score -2.2 LFN and L-spine,  FRAX 19%, 4.0% when calculated with fragility fracture  Patient is high risk for future fractures and antiresorptive therapy is indicated at this time  Per review of documentation, patient has had T-score as low as -2.5 previously, but DEXA not available to review  Patient's OP treatment history has been unclear but seems to have consisted of various PO bisphosphonates and ?short term Prolia use. Unclear why PO bisphosphonates were stopped, patient also does not recall side effects  Will restart alendronate 70 mg weekly  Evaluate for secondary causes of OP- obtain PTH, CMP, magnesium, phosphorus, PTH, SPEP, vitamin D  Repeat DEXA 12/2025  Continue vitamin D and calcium supplementation  Encouraged weight bearing exercise    Elevated bone specific ALP  Most likely 2/2 osteoporosis, but will evaluate for underlying Paget's disease  Obtain markers of bone turnover-  C-telopeptide fasting, Procollagen Type I Intact N Terminal Propeptide  Obtain XR skull, pelvis, C-spine, T-spine, L-spine    RTC 12/2025 after DEXA or sooner PRN    Patient seen and discussed with Dr. Feliciano.    Malena Em MD  Rheumatology Fellow PGY-4    Orders Placed This Encounter   Procedures    XR pelvis 3+ views    XR skull 1-3 views    XR thoracic spine 2 views    XR lumbar spine 2-3 views    XR cervical spine 2-3 views    XR DEXA bone density    Vitamin D 25-Hydroxy,Total (for eval of Vitamin D levels)    Comprehensive metabolic panel    C-Telopeptide, Beta Cross Linked    QUEST MISCELLANEOUS TEST (REFRIGERATED)    Parathyroid Hormone, Intact    Magnesium    Phosphorus    Serum Protein Electrophoresis             [1]   Patient Active Problem List  Diagnosis    Abdominal cramping    Acquired hypothyroidism    Benign essential HTN    Bunion of great toe    Chronic colitis    Colon polyps    Diarrhea    Dysfunction of both eustachian tubes    Eczema    Elevated LDL cholesterol level    Hallux limitus of left foot    Lower extremity pain, diffuse    Paresthesia of bilateral legs    History of colon polyps    Intermittent left-sided chest pain    Localized osteoporosis without current pathological fracture    Osteoporosis    Lumbosacral spondylosis with radiculopathy    Muscle spasm    Onychomycosis of toenail    PAD (peripheral artery disease) (CMS-HCC)    Nasal discharge with watery eyes    Splenic mass    Stiffness of finger joint of right hand    Healthcare maintenance    Prophylactic antibiotic    Acute bronchitis with bronchospasm    Elevated alkaline phosphatase level    Wound of right leg   [2]   Past Medical History:  Diagnosis Date    Acute laryngitis 07/18/2023    Acute otitis media, right 07/18/2023    Closed extra-articular fracture of distal end of right radius 07/18/2023    Dyslipidemia 07/18/2023    Fracture, radius and ulna, proximal, right, closed, initial encounter 07/18/2023    Hip pain,  left 07/18/2023    Hypothyroidism, unspecified 04/06/2017    Acquired hypothyroidism    Imbalance 07/18/2023    Injury of right wrist 07/18/2023    Low vitamin D level 07/18/2023    Nontoxic single thyroid nodule 05/27/2021    Thyroid nodule    Osteopenia 07/18/2023    Other conditions influencing health status 08/21/2021    History of cough    Other specified disorders of bone density and structure, unspecified site 08/10/2020    Osteopenia    Pre-ulcerative calluses 07/18/2023    Prediabetes 07/18/2023    Prolonged capillary refill time 07/18/2023    Right shoulder pain 07/18/2023    Right wrist pain 07/18/2023    Stiffness of right wrist, not elsewhere classified 07/18/2023    Strain of left wrist 07/18/2023    Thyroid enlarged 07/18/2023    Thyroid nodule 07/18/2023    Tinea pedis of both feet 07/18/2023    Urine frequency 07/18/2023   [3]   Past Surgical History:  Procedure Laterality Date    FOOT SURGERY  02/02/2016    Foot Surgery    ROTATOR CUFF REPAIR  02/02/2016    Rotator Cuff Repair    TOTAL HIP ARTHROPLASTY  03/28/2017    Hip Replacement    US GUIDED THYROID BIOPSY  5/16/2016    US GUIDED THYROID BIOPSY 5/16/2016 [facility]   [4]   Allergies  Allergen Reactions    Quinine Other    Keflex [Cephalexin] Rash   [5]   Family History  Problem Relation Name Age of Onset    Alzheimer's disease Other      Heart attack Other     [6]   Social History  Tobacco Use    Smoking status: Never    Smokeless tobacco: Never   Substance Use Topics    Alcohol use: Never    Drug use: Never

## 2025-04-29 ENCOUNTER — APPOINTMENT (OUTPATIENT)
Dept: RHEUMATOLOGY | Facility: CLINIC | Age: 80
End: 2025-04-29
Payer: COMMERCIAL

## 2025-04-29 VITALS
HEART RATE: 73 BPM | OXYGEN SATURATION: 95 % | DIASTOLIC BLOOD PRESSURE: 63 MMHG | TEMPERATURE: 97.3 F | SYSTOLIC BLOOD PRESSURE: 112 MMHG | BODY MASS INDEX: 24.16 KG/M2 | WEIGHT: 145 LBS | HEIGHT: 65 IN

## 2025-04-29 DIAGNOSIS — R74.8 ELEVATED ALKALINE PHOSPHATASE LEVEL: ICD-10-CM

## 2025-04-29 DIAGNOSIS — M88.9 PAGET DISEASE OF BONE: ICD-10-CM

## 2025-04-29 DIAGNOSIS — M81.0 AGE RELATED OSTEOPOROSIS, UNSPECIFIED PATHOLOGICAL FRACTURE PRESENCE: Primary | ICD-10-CM

## 2025-04-29 DIAGNOSIS — M81.8 OTHER OSTEOPOROSIS WITHOUT CURRENT PATHOLOGICAL FRACTURE: ICD-10-CM

## 2025-04-29 PROCEDURE — 1159F MED LIST DOCD IN RCRD: CPT | Performed by: STUDENT IN AN ORGANIZED HEALTH CARE EDUCATION/TRAINING PROGRAM

## 2025-04-29 PROCEDURE — 99204 OFFICE O/P NEW MOD 45 MIN: CPT | Performed by: STUDENT IN AN ORGANIZED HEALTH CARE EDUCATION/TRAINING PROGRAM

## 2025-04-29 PROCEDURE — 3078F DIAST BP <80 MM HG: CPT | Performed by: STUDENT IN AN ORGANIZED HEALTH CARE EDUCATION/TRAINING PROGRAM

## 2025-04-29 PROCEDURE — 3074F SYST BP LT 130 MM HG: CPT | Performed by: STUDENT IN AN ORGANIZED HEALTH CARE EDUCATION/TRAINING PROGRAM

## 2025-04-29 PROCEDURE — 1123F ACP DISCUSS/DSCN MKR DOCD: CPT | Performed by: STUDENT IN AN ORGANIZED HEALTH CARE EDUCATION/TRAINING PROGRAM

## 2025-04-29 NOTE — PATIENT INSTRUCTIONS
Please obtain fasting lab work and X-rays  Update DEXA scan in December 2025  Please start taking Fosamax 70 mg weekly, notify us with any questions or concerns    Office number [phone]

## 2025-04-29 NOTE — PROGRESS NOTES
I saw and evaluated the patient. I personally obtained the key and critical portions of the history and physical exam or was physically present for key and critical portions performed by the resident/fellow. I reviewed the resident/fellow's documentation and discussed the patient with the resident/fellow. I agree with the resident/fellow's medical decision making as documented in the note.     Cb Feliciano MD  Division of Rheumatology  [facility]

## 2025-04-30 ENCOUNTER — LAB (OUTPATIENT)
Dept: LAB | Facility: HOSPITAL | Age: 80
End: 2025-04-30
Payer: COMMERCIAL

## 2025-04-30 ENCOUNTER — HOSPITAL ENCOUNTER (OUTPATIENT)
Dept: RADIOLOGY | Facility: CLINIC | Age: 80
Discharge: HOME | End: 2025-04-30
Payer: COMMERCIAL

## 2025-04-30 DIAGNOSIS — R74.8 ELEVATED ALKALINE PHOSPHATASE LEVEL: ICD-10-CM

## 2025-04-30 DIAGNOSIS — M88.9 PAGET DISEASE OF BONE: ICD-10-CM

## 2025-04-30 LAB — PROT SERPL-MCNC: 6.8 G/DL (ref 6.4–8.2)

## 2025-04-30 PROCEDURE — 72072 X-RAY EXAM THORAC SPINE 3VWS: CPT

## 2025-04-30 PROCEDURE — 72072 X-RAY EXAM THORAC SPINE 3VWS: CPT | Performed by: RADIOLOGY

## 2025-04-30 PROCEDURE — 70250 X-RAY EXAM OF SKULL: CPT

## 2025-04-30 PROCEDURE — 72110 X-RAY EXAM L-2 SPINE 4/>VWS: CPT | Performed by: RADIOLOGY

## 2025-04-30 PROCEDURE — 84155 ASSAY OF PROTEIN SERUM: CPT

## 2025-04-30 PROCEDURE — 72170 X-RAY EXAM OF PELVIS: CPT

## 2025-04-30 PROCEDURE — 72050 X-RAY EXAM NECK SPINE 4/5VWS: CPT

## 2025-04-30 PROCEDURE — 72110 X-RAY EXAM L-2 SPINE 4/>VWS: CPT

## 2025-04-30 PROCEDURE — 72170 X-RAY EXAM OF PELVIS: CPT | Performed by: RADIOLOGY

## 2025-04-30 PROCEDURE — 70250 X-RAY EXAM OF SKULL: CPT | Performed by: RADIOLOGY

## 2025-04-30 PROCEDURE — 72050 X-RAY EXAM NECK SPINE 4/5VWS: CPT | Performed by: RADIOLOGY

## 2025-04-30 PROCEDURE — 84165 PROTEIN E-PHORESIS SERUM: CPT

## 2025-05-02 DIAGNOSIS — M62.838 MUSCLE SPASM: ICD-10-CM

## 2025-05-02 DIAGNOSIS — E03.9 ACQUIRED HYPOTHYROIDISM: ICD-10-CM

## 2025-05-02 LAB
ALBUMIN: 3.8 G/DL (ref 3.4–5)
ALPHA 1 GLOBULIN: 0.4 G/DL (ref 0.2–0.6)
ALPHA 2 GLOBULIN: 0.8 G/DL (ref 0.4–1.1)
BETA GLOBULIN: 0.7 G/DL (ref 0.5–1.2)
GAMMA GLOBULIN: 1.1 G/DL (ref 0.5–1.4)
PATH REVIEW-SERUM PROTEIN ELECTROPHORESIS: NORMAL
PROTEIN ELECTROPHORESIS COMMENT: NORMAL

## 2025-05-03 RX ORDER — LEVOTHYROXINE SODIUM 75 UG/1
75 TABLET ORAL DAILY
Qty: 100 TABLET | Refills: 1 | Status: SHIPPED | OUTPATIENT
Start: 2025-05-03

## 2025-05-03 RX ORDER — TIZANIDINE 4 MG/1
4 TABLET ORAL NIGHTLY PRN
Qty: 90 TABLET | Refills: 1 | Status: SHIPPED | OUTPATIENT
Start: 2025-05-03

## 2025-05-04 LAB
25(OH)D3+25(OH)D2 SERPL-MCNC: 86 NG/ML (ref 30–100)
ALBUMIN SERPL-MCNC: 4 G/DL (ref 3.6–5.1)
ALP SERPL-CCNC: 107 U/L (ref 37–153)
ALT SERPL-CCNC: 13 U/L (ref 6–29)
ANION GAP SERPL CALCULATED.4IONS-SCNC: 9 MMOL/L (CALC) (ref 7–17)
AST SERPL-CCNC: 16 U/L (ref 10–35)
BILIRUB SERPL-MCNC: 0.5 MG/DL (ref 0.2–1.2)
BUN SERPL-MCNC: 16 MG/DL (ref 7–25)
CALCIUM SERPL-MCNC: 9.5 MG/DL (ref 8.6–10.4)
CHLORIDE SERPL-SCNC: 102 MMOL/L (ref 98–110)
CO2 SERPL-SCNC: 29 MMOL/L (ref 20–32)
COLLAGEN CTX SERPL-MCNC: 448 PG/ML
CREAT SERPL-MCNC: 1.03 MG/DL (ref 0.6–0.95)
EGFRCR SERPLBLD CKD-EPI 2021: 55 ML/MIN/1.73M2
GLUCOSE SERPL-MCNC: 105 MG/DL (ref 65–99)
MAGNESIUM SERPL-MCNC: 2.3 MG/DL (ref 1.5–2.5)
PHOSPHATE SERPL-MCNC: 4.6 MG/DL (ref 2.1–4.3)
PINP SER-MCNC: 41 MCG/L
POTASSIUM SERPL-SCNC: 4.5 MMOL/L (ref 3.5–5.3)
PROT SERPL-MCNC: 6.9 G/DL (ref 6.1–8.1)
PTH-INTACT SERPL-MCNC: 15 PG/ML (ref 16–77)
SODIUM SERPL-SCNC: 140 MMOL/L (ref 135–146)

## 2025-05-05 ENCOUNTER — TELEPHONE (OUTPATIENT)
Dept: RHEUMATOLOGY | Facility: CLINIC | Age: 80
End: 2025-05-05
Payer: COMMERCIAL

## 2025-05-05 DIAGNOSIS — N17.9 AKI (ACUTE KIDNEY INJURY): Primary | ICD-10-CM

## 2025-05-06 NOTE — TELEPHONE ENCOUNTER
Spoke with patient to discuss lab and imaging results. Imaging findings not concerning for Paget's disease. Continue alendronate as discussed.    Noted decline in renal function- encouraged PO hydration and repeat BMP in 2 weeks. Patient agreeable.

## 2025-05-17 LAB
ANION GAP SERPL CALCULATED.4IONS-SCNC: 12 MMOL/L (CALC) (ref 7–17)
BUN SERPL-MCNC: 20 MG/DL (ref 7–25)
BUN/CREAT SERPL: NORMAL (CALC) (ref 6–22)
CALCIUM SERPL-MCNC: 9.8 MG/DL (ref 8.6–10.4)
CHLORIDE SERPL-SCNC: 99 MMOL/L (ref 98–110)
CO2 SERPL-SCNC: 28 MMOL/L (ref 20–32)
CREAT SERPL-MCNC: 0.83 MG/DL (ref 0.6–0.95)
EGFRCR SERPLBLD CKD-EPI 2021: 71 ML/MIN/1.73M2
GLUCOSE SERPL-MCNC: 74 MG/DL (ref 65–99)
POTASSIUM SERPL-SCNC: 4.3 MMOL/L (ref 3.5–5.3)
SODIUM SERPL-SCNC: 139 MMOL/L (ref 135–146)

## 2025-05-19 DIAGNOSIS — N17.9 AKI (ACUTE KIDNEY INJURY): ICD-10-CM

## 2025-05-29 ENCOUNTER — OFFICE VISIT (OUTPATIENT)
Dept: PRIMARY CARE | Facility: CLINIC | Age: 80
End: 2025-05-29
Payer: COMMERCIAL

## 2025-05-29 VITALS
HEIGHT: 65 IN | WEIGHT: 139 LBS | BODY MASS INDEX: 23.16 KG/M2 | SYSTOLIC BLOOD PRESSURE: 132 MMHG | DIASTOLIC BLOOD PRESSURE: 58 MMHG | HEART RATE: 82 BPM | OXYGEN SATURATION: 97 % | RESPIRATION RATE: 16 BRPM

## 2025-05-29 DIAGNOSIS — S81.801D WOUND OF RIGHT LOWER EXTREMITY, SUBSEQUENT ENCOUNTER: Primary | ICD-10-CM

## 2025-05-29 PROCEDURE — 3078F DIAST BP <80 MM HG: CPT | Performed by: FAMILY MEDICINE

## 2025-05-29 PROCEDURE — 99213 OFFICE O/P EST LOW 20 MIN: CPT | Performed by: FAMILY MEDICINE

## 2025-05-29 PROCEDURE — 1036F TOBACCO NON-USER: CPT | Performed by: FAMILY MEDICINE

## 2025-05-29 PROCEDURE — 1160F RVW MEDS BY RX/DR IN RCRD: CPT | Performed by: FAMILY MEDICINE

## 2025-05-29 PROCEDURE — 1159F MED LIST DOCD IN RCRD: CPT | Performed by: FAMILY MEDICINE

## 2025-05-29 PROCEDURE — 3075F SYST BP GE 130 - 139MM HG: CPT | Performed by: FAMILY MEDICINE

## 2025-05-29 ASSESSMENT — PATIENT HEALTH QUESTIONNAIRE - PHQ9
2. FEELING DOWN, DEPRESSED OR HOPELESS: NOT AT ALL
SUM OF ALL RESPONSES TO PHQ9 QUESTIONS 1 AND 2: 0
1. LITTLE INTEREST OR PLEASURE IN DOING THINGS: NOT AT ALL
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
## 2025-05-29 NOTE — PATIENT INSTRUCTIONS
Referred to wound care as discussed.  Requested records from dermatology (records release signed).    F/U w/PCP.

## 2025-06-03 ENCOUNTER — TELEPHONE (OUTPATIENT)
Dept: RHEUMATOLOGY | Facility: CLINIC | Age: 80
End: 2025-06-03
Payer: COMMERCIAL

## 2025-06-03 ENCOUNTER — APPOINTMENT (OUTPATIENT)
Dept: WOUND CARE | Facility: CLINIC | Age: 80
End: 2025-06-03
Payer: COMMERCIAL

## 2025-06-03 NOTE — TELEPHONE ENCOUNTER
Attempted to call patient x2 to follow-up BMP (renal function returned to baseline) and L5-S1 facet sclerosis. No answer. Will attempt again.

## 2025-08-02 DIAGNOSIS — R19.7 DIARRHEA, UNSPECIFIED TYPE: ICD-10-CM

## 2025-08-08 RX ORDER — CHOLESTYRAMINE 4 G/9G
1 POWDER, FOR SUSPENSION ORAL EVERY OTHER DAY
Qty: 45 PACKET | Refills: 0 | OUTPATIENT
Start: 2025-08-08

## 2025-09-05 DIAGNOSIS — I10 BENIGN ESSENTIAL HTN: Primary | ICD-10-CM

## 2025-09-09 ENCOUNTER — APPOINTMENT (OUTPATIENT)
Dept: PRIMARY CARE | Facility: CLINIC | Age: 80
End: 2025-09-09
Payer: COMMERCIAL

## 2025-09-30 ENCOUNTER — APPOINTMENT (OUTPATIENT)
Dept: GASTROENTEROLOGY | Facility: CLINIC | Age: 80
End: 2025-09-30
Payer: COMMERCIAL